# Patient Record
Sex: MALE | Race: WHITE
[De-identification: names, ages, dates, MRNs, and addresses within clinical notes are randomized per-mention and may not be internally consistent; named-entity substitution may affect disease eponyms.]

---

## 2019-09-20 ENCOUNTER — HOSPITAL ENCOUNTER (OUTPATIENT)
Dept: HOSPITAL 92 - NM | Age: 69
Discharge: HOME | End: 2019-09-20
Payer: MEDICARE

## 2019-09-20 DIAGNOSIS — R41.0: ICD-10-CM

## 2019-09-20 DIAGNOSIS — G20: Primary | ICD-10-CM

## 2019-09-20 DIAGNOSIS — R44.1: ICD-10-CM

## 2019-09-20 PROCEDURE — 78607: CPT

## 2019-09-20 PROCEDURE — A9584 IODINE I-123 IOFLUPANE: HCPCS

## 2019-09-20 NOTE — NM
NUCLEAR MEDICINE BRAIN IMAGING:

 

Date:  09/20/19 

 

HISTORY:  

Parkinson's disease. 

 

TECHNIQUE:  

A DaTscan with axial tomographic images of the brain was obtained 3 hours following the intravenous a
dministration of 5.4 mCi Iodine-123 Ioflupane. The patient was pretreated with 130 mg of potassium io
dide orally 1 hour prior to the injection. 

 

FINDINGS:

There is loss of normal symmetric uptake in the striata bilaterally. 

 

IMPRESSION: 

Parkinsonian syndrome. 

 

 

POS: [facility]

## 2020-09-19 ENCOUNTER — HOSPITAL ENCOUNTER (INPATIENT)
Dept: HOSPITAL 18 - NAV ACUTE | Age: 70
LOS: 41 days | Discharge: HOME HEALTH | DRG: 57 | End: 2020-10-30
Attending: INTERNAL MEDICINE | Admitting: INTERNAL MEDICINE
Payer: MEDICARE

## 2020-09-19 VITALS — BODY MASS INDEX: 19.8 KG/M2

## 2020-09-19 DIAGNOSIS — Z95.810: ICD-10-CM

## 2020-09-19 DIAGNOSIS — I25.5: ICD-10-CM

## 2020-09-19 DIAGNOSIS — G31.83: ICD-10-CM

## 2020-09-19 DIAGNOSIS — I50.9: ICD-10-CM

## 2020-09-19 DIAGNOSIS — Z66: ICD-10-CM

## 2020-09-19 DIAGNOSIS — I25.10: ICD-10-CM

## 2020-09-19 DIAGNOSIS — I25.2: ICD-10-CM

## 2020-09-19 DIAGNOSIS — D62: ICD-10-CM

## 2020-09-19 DIAGNOSIS — N39.0: ICD-10-CM

## 2020-09-19 DIAGNOSIS — I11.0: ICD-10-CM

## 2020-09-19 DIAGNOSIS — J44.9: ICD-10-CM

## 2020-09-19 DIAGNOSIS — B96.20: ICD-10-CM

## 2020-09-19 DIAGNOSIS — F02.80: ICD-10-CM

## 2020-09-19 DIAGNOSIS — E78.5: ICD-10-CM

## 2020-09-19 DIAGNOSIS — S72.142D: ICD-10-CM

## 2020-09-19 DIAGNOSIS — G20: Primary | ICD-10-CM

## 2020-09-19 DIAGNOSIS — R53.81: ICD-10-CM

## 2020-09-19 PROCEDURE — 85025 COMPLETE CBC W/AUTO DIFF WBC: CPT

## 2020-09-19 PROCEDURE — 36415 COLL VENOUS BLD VENIPUNCTURE: CPT

## 2020-09-19 PROCEDURE — 71045 X-RAY EXAM CHEST 1 VIEW: CPT

## 2020-09-19 PROCEDURE — 70450 CT HEAD/BRAIN W/O DYE: CPT

## 2020-09-19 PROCEDURE — 80048 BASIC METABOLIC PNL TOTAL CA: CPT

## 2020-09-19 PROCEDURE — 80053 COMPREHEN METABOLIC PANEL: CPT

## 2020-09-19 PROCEDURE — 94664 DEMO&/EVAL PT USE INHALER: CPT

## 2020-09-20 LAB
ALBUMIN SERPL BCG-MCNC: 3.3 G/DL (ref 3.4–4.8)
ALP SERPL-CCNC: 54 U/L (ref 40–110)
ALT SERPL W P-5'-P-CCNC: 19 U/L (ref 8–55)
ANION GAP SERPL CALC-SCNC: 13 MMOL/L (ref 10–20)
AST SERPL-CCNC: 25 U/L (ref 5–34)
BASOPHILS # BLD AUTO: 0 THOU/UL (ref 0–0.2)
BASOPHILS NFR BLD AUTO: 0.6 % (ref 0–1)
BILIRUB SERPL-MCNC: 2 MG/DL (ref 0.2–1.2)
BUN SERPL-MCNC: 17 MG/DL (ref 8.4–25.7)
CALCIUM SERPL-MCNC: 8.7 MG/DL (ref 7.8–10.44)
CHLORIDE SERPL-SCNC: 103 MMOL/L (ref 98–107)
CO2 SERPL-SCNC: 28 MMOL/L (ref 23–31)
CREAT CL PREDICTED SERPL C-G-VRATE: 87 ML/MIN (ref 70–130)
EOSINOPHIL # BLD AUTO: 0.4 THOU/UL (ref 0–0.7)
EOSINOPHIL NFR BLD AUTO: 4.4 % (ref 0–10)
GLOBULIN SER CALC-MCNC: 2.6 G/DL (ref 2.4–3.5)
GLUCOSE SERPL-MCNC: 102 MG/DL (ref 83–110)
HGB BLD-MCNC: 9.1 G/DL (ref 14–18)
LYMPHOCYTES # BLD AUTO: 1 THOU/UL (ref 1.2–3.4)
LYMPHOCYTES NFR BLD AUTO: 12.7 % (ref 21–51)
MANUAL DIFF??: NO
MCH RBC QN AUTO: 30.1 PG (ref 27–31)
MCV RBC AUTO: 96.4 FL (ref 78–98)
MONOCYTES # BLD AUTO: 0.7 THOU/UL (ref 0.11–0.59)
MONOCYTES NFR BLD AUTO: 8.7 % (ref 0–10)
NEUTROPHILS # BLD AUTO: 6.1 THOU/UL (ref 1.4–6.5)
NEUTROPHILS NFR BLD AUTO: 73.7 % (ref 42–75)
PLATELET # BLD AUTO: 465 THOU/UL (ref 130–400)
POTASSIUM SERPL-SCNC: 3.7 MMOL/L (ref 3.5–5.1)
RBC # BLD AUTO: 3.03 MILL/UL (ref 4.7–6.1)
SODIUM SERPL-SCNC: 140 MMOL/L (ref 136–145)
WBC # BLD AUTO: 8.2 THOU/UL (ref 4.8–10.8)

## 2020-09-20 RX ADMIN — LACTOBACILLUS ACIDOPH-L.BULGARICUS 1 MILLION CELL CHEWABLE TABLET SCH TAB: at 09:07

## 2020-09-20 RX ADMIN — Medication SCH TAB: at 09:08

## 2020-09-20 RX ADMIN — MOMETASONE FUROATE AND FORMOTEROL FUMARATE DIHYDRATE SCH PUFF: 200; 5 AEROSOL RESPIRATORY (INHALATION) at 17:59

## 2020-09-20 RX ADMIN — MOMETASONE FUROATE AND FORMOTEROL FUMARATE DIHYDRATE SCH PUFF: 200; 5 AEROSOL RESPIRATORY (INHALATION) at 05:41

## 2020-09-20 NOTE — HP
Admission to the [facility].



HISTORY OF PRESENT ILLNESS:  The patient is an unfortunate 69-year-old white male

with a progressive history of Lewy body dementia and Parkinson disease, who has

fallen and suffered a left intertrochanteric proximal femur fracture.  He was

treated with IM lucie fixation on September 16th at [facility].  He had

only complications postoperatively with some mild blood loss with anemia and urinary

tract infection.  He did not have any complications of his Lewy body dementia, but

has remained confused, and his primary caregiver, his wife, has made all decisions

and has been with the patient.  He has had no difficulty swallowing with his

Parkinson disease.  He, however, has remained very stiff and rigid.  He has been

attempting to cooperate with therapy and has had only occasional delirium. 



PAST MEDICAL HISTORY:  Positive for asthma and COPD as a younger man, only requiring

p.r.n. beta agonist at this time.  He also has a history of hypertension, which has

been well controlled and a history of a previous myocardial infarction with apparent

ischemic cardiomyopathy, requiring a defibrillator because of a 35% ejection

fraction many years ago.  He, however, has had no dyspnea or chest pain or

arrhythmias or syncope, but has progressively become weaker, more confused over the

last year with a progression of his Lewy body dementia and Parkinson disease.  He

also has a history of hyperlipidemia and class 1 congestive heart failure. 



ALLERGIES:  HE HAS NO KNOWN ALLERGIES.



SOCIAL HISTORY:  He is a nonsmoker, nondrinker.  He lives with his wife at home with

caregiver's assistance. 



FAMILY HISTORY:  Positive for his father dying of early heart attack.



REVIEW OF SYSTEMS:  He is unable to give history, but wife gives all the history.

He has only occasional cough.  He has had no wheezing.  He has denied any chest pain

or shortness of breath.  He has had no nausea, vomiting, diarrhea, or anorexia.  He

does have incontinence, but has no previous history of urinary tract infection until

this admission.  He has been treated with Myrbetriq, but with minimal improvement in

his incontinence.  He has had no problems with bowel movements or constipation. 



MEDICATIONS:  On admission here were:

1. Tylenol 500 mg every 6 hours as needed for pain.

2. Did take oxycodone immediately postop, but none since then. 

3. He is on calcium carbonate with vitamin D3.

4. Cefdinir 300 mg twice daily, urinary tract infection with Escherichia coli.

5. Lovenox 40 mg subcu for 8 weeks postop to prevent DVT per recommendations of

orthopedic surgeon. 

6. Ferrous sulfate 325 mg twice daily with meals.

7. MiraLAX 17 g daily.

8. Aricept 10 mg nightly.

9. Carvedilol 3.125 mg twice daily.

10. Ezetimibe 10 mg daily.

11. Fluticasone-salmeterol 230-21 two puffs every 12 hours.

12. Fluticasone 50 mcg nasal spray two sprays twice daily.

13. Magnesium oxide 400 mg daily.

14. Niacin 1000 mg nightly.

15. Lovaza 1 g twice daily.

16. He was on Myrbetriq as mentioned above, but wife wishes to discontinue this.



PHYSICAL EXAMINATION:

GENERAL:  At this time, shows an elderly white male, who is awake, no distress, only

occasional cough, but does not respond verbally very much.  He has been recently

working with therapy, but is very stiff and rigid, is not combatant or agitated. 

VITAL SIGNS:  Show him to have a temperature 96.9, pulse 80, respirations 18, O2

saturation is 95% on room air, and blood pressure 113/56. 

LUNGS:  Show only a few diffuse rhonchi. 

CARDIAC:  Shows regular rhythm.  Defibrillator is in place. 

ABDOMEN:  Soft and nontender.  No masses or organomegaly. 

SKIN/EXTREMITIES:  Display left hip lateral incision bandaged with no erythema,

warmth, or drainage.  No tenderness. 

NEUROLOGIC:  There is significant rigidity.  Minimal tremor.  Cranial nerves appear

to be intact, but there are masked faces and bradykinesia. 



LABORATORY DATA:  Laboratory will be done in the a.m.



ASSESSMENT:  

1. Left intertrochanteric hip fracture, status post IM nailing with pain control

with Tylenol.  We will see if needs stronger medication with therapy. 

2. Significant Lewy body dementia and Parkinson disease, only on Aricept at this

time with minimal agitation, attempting to cooperate with therapy. 

3. History of ischemic cardiomyopathy with defibrillator, but with no symptoms of

systolic heart failure at this time. 

4. Hypertension, controlled to goal.

5. Incontinence.  No relief with Myrbetriq, will be discontinued. 

6. Escherichia coli urinary tract infection, on cefdinir, for 2 more days.

7. Deep venous thrombosis prophylaxis with Lovenox for 8 weeks per recommendations

of orthopedic surgeon. 



The patient is not to be resuscitated and has an advance directive, that was brought

in by his power of , his wife. 







Job ID:  [id number] Atrial fibrillation

## 2020-09-21 RX ADMIN — MOMETASONE FUROATE AND FORMOTEROL FUMARATE DIHYDRATE SCH PUFF: 200; 5 AEROSOL RESPIRATORY (INHALATION) at 05:39

## 2020-09-21 RX ADMIN — MOMETASONE FUROATE AND FORMOTEROL FUMARATE DIHYDRATE SCH PUFF: 200; 5 AEROSOL RESPIRATORY (INHALATION) at 17:15

## 2020-09-21 RX ADMIN — Medication SCH TAB: at 08:45

## 2020-09-21 RX ADMIN — LACTOBACILLUS ACIDOPH-L.BULGARICUS 1 MILLION CELL CHEWABLE TABLET SCH TAB: at 08:45

## 2020-09-21 NOTE — PRG
DATE OF SERVICE:  09/21/2020



SUBJECTIVE:  The patient is in bed, __________ sleeping, has eaten partially his

supper.  He does awaken to questions and does answer fairly appropriately.  However,

wife states that he has not cooperated well today and has been much more lethargic

and stiff than he has in the past. 



OBJECTIVE:  VITAL SIGNS:  Blood pressure is 129/65, temperature is 98, pulse 85,

respirations 20, and O2 sats 93% on room air. 

LUNGS:  Clear. 

CARDIAC:  Regular rhythm. 

ABDOMEN:  Soft and nontender. 

SKIN AND EXTREMITIES:  No edema, clubbing, or cyanosis. 

NEUROLOGIC:  Cogwheel rigidity, bradykinesia.



ASSESSMENT:  

1. Left intertrochanteric hip fracture status post intramedullary nailing with pain

control. 

2. Significant Lewy body dementia with significant stiffness, bradykinesia, and

slowly worsening cognitive deficits. 

3. History of ischemic cardiomyopathy with defibrillator with no evidence of angina

or congestive heart failure. 

4. Hypertension, controlled to goal.

5. Escherichia coli urinary tract infection, on cefdinir for one more day.

6. Deep venous thrombosis prophylaxis with Lovenox for 8 weeks.







Job ID:  [id number]

## 2020-09-21 NOTE — PRG
DATE OF SERVICE:  09/20/2020



SUBJECTIVE:  The patient is more sedated today.  Still not improving in his mental

status.  Has not had therapy today because of the weekend. 



OBJECTIVE:  VITAL SIGNS:  Show temperature is 98, pulse 82, respirations 18, O2 sats

100% on room air, and blood pressure 130/68. 

NEUROLOGIC:  Shows significant rigidity, bradykinesia.  Deep tendon reflex, 2+ and

equal.  Cranial nerves intact. 



LABORATORY DATA:  White count is 8200, hematocrit 29, and hemoglobin 9.  Sodium 140,

potassium 3.7, chloride 103, bicarb 28, BUN 17, creatinine 0.78, total bilirubin 2,

and albumin 3. 



PLAN:  

1. Continue PT, OT, and speech evaluation tomorrow.

2. Continue Aricept.

3. Continue cefdinir for urinary tract infection.

4. Monitor oral intake.







Job ID:  [id number]

## 2020-09-22 RX ADMIN — MOMETASONE FUROATE AND FORMOTEROL FUMARATE DIHYDRATE SCH PUFF: 200; 5 AEROSOL RESPIRATORY (INHALATION) at 18:21

## 2020-09-22 RX ADMIN — Medication SCH TAB: at 09:17

## 2020-09-22 RX ADMIN — LACTOBACILLUS ACIDOPH-L.BULGARICUS 1 MILLION CELL CHEWABLE TABLET SCH TAB: at 09:17

## 2020-09-22 RX ADMIN — MOMETASONE FUROATE AND FORMOTEROL FUMARATE DIHYDRATE SCH PUFF: 200; 5 AEROSOL RESPIRATORY (INHALATION) at 05:37

## 2020-09-23 RX ADMIN — LACTOBACILLUS ACIDOPH-L.BULGARICUS 1 MILLION CELL CHEWABLE TABLET SCH TAB: at 08:27

## 2020-09-23 RX ADMIN — MOMETASONE FUROATE AND FORMOTEROL FUMARATE DIHYDRATE SCH PUFF: 200; 5 AEROSOL RESPIRATORY (INHALATION) at 06:06

## 2020-09-23 RX ADMIN — MOMETASONE FUROATE AND FORMOTEROL FUMARATE DIHYDRATE SCH PUFF: 200; 5 AEROSOL RESPIRATORY (INHALATION) at 18:28

## 2020-09-23 RX ADMIN — Medication SCH TAB: at 08:27

## 2020-09-23 NOTE — PRG
DATE OF SERVICE:  09/22/2020



SUBJECTIVE:  The patient is sitting in bed, has eaten slightly, but did eat better

today.  Did work with therapy today and having tolerable pain, but did have some

agitation this morning; however, wife is in the room and did calm him down and he

did work with therapy. 



OBJECTIVE:  VITAL SIGNS:  Shows his blood pressure is 115/61, temperature is 98,

pulse 70, respirations 20, O2 saturations 95% on room air. 

LUNGS:  Clear. 

CARDIAC:  Showed regular rhythm. 

ABDOMEN:  Soft and nontender. 

NEUROLOGICAL:  Shows significant bradykinesia and cogwheel rigidity and some minimal

tremor.  The patient also is intermittently lethargic, but does respond

appropriately at times. 



ASSESSMENT:  

1. Lewy body dementia, slowly progressive.

2. Fracture of the left hip status, intramedullary nailing with pain control on

Tylenol. 

3. History of ischemic cardiomyopathy, a defibrillator.  No evidence of angina.

4. Hypertension, controlled to go.

5. Escherichia coli urinary tract infection with finished treatment with cefdinir.

6. Deep vein thrombosis prophylaxis, on Lovenox.



PLAN:  

1. Continue PT, OT.

2. Continue to monitor for behavioral disturbances.

3. Continue to monitor for DVT prophylaxis.

4. Continue to monitor for recurrent signs of ischemia.

5. Continue to monitor vital signs closely.







Job ID:  [id number]

## 2020-09-24 RX ADMIN — HYDROCODONE BITARTRATE AND ACETAMINOPHEN PRN TAB: 5; 325 TABLET ORAL at 09:40

## 2020-09-24 RX ADMIN — HYDROCODONE BITARTRATE AND ACETAMINOPHEN PRN TAB: 5; 325 TABLET ORAL at 20:55

## 2020-09-24 RX ADMIN — LACTOBACILLUS ACIDOPH-L.BULGARICUS 1 MILLION CELL CHEWABLE TABLET SCH TAB: at 08:10

## 2020-09-24 RX ADMIN — MOMETASONE FUROATE AND FORMOTEROL FUMARATE DIHYDRATE SCH PUFF: 200; 5 AEROSOL RESPIRATORY (INHALATION) at 05:40

## 2020-09-24 RX ADMIN — MOMETASONE FUROATE AND FORMOTEROL FUMARATE DIHYDRATE SCH PUFF: 200; 5 AEROSOL RESPIRATORY (INHALATION) at 17:35

## 2020-09-24 RX ADMIN — Medication SCH TAB: at 08:10

## 2020-09-25 RX ADMIN — Medication SCH TAB: at 08:34

## 2020-09-25 RX ADMIN — MOMETASONE FUROATE AND FORMOTEROL FUMARATE DIHYDRATE SCH PUFF: 200; 5 AEROSOL RESPIRATORY (INHALATION) at 17:52

## 2020-09-25 RX ADMIN — LACTOBACILLUS ACIDOPH-L.BULGARICUS 1 MILLION CELL CHEWABLE TABLET SCH TAB: at 08:33

## 2020-09-25 RX ADMIN — MOMETASONE FUROATE AND FORMOTEROL FUMARATE DIHYDRATE SCH PUFF: 200; 5 AEROSOL RESPIRATORY (INHALATION) at 05:48

## 2020-09-25 NOTE — PRG
DATE OF SERVICE:  09/25/2020



SUBJECTIVE:  The patient is sitting in the bed, visiting with his wife, answers

questions occasionally and appropriately.  Wife states he had a much better day

today where he is working with therapy, has been eating well and she felt that there

has been a marked improvement. 



OBJECTIVE:  VITAL SIGNS:  Shows temperature 97, pulse 79, respirations 20, O2 sats

97% on room air, blood pressure 119/56. 

LUNGS:  Clear. 

CARDIAC:  Regular rhythm. ABDOMEN:  Soft and nontender. 

NEUROLOGIC:  Shows significant bradykinesia and cogwheel rigidity.   

__________ show healing intramedullary nail  __________.



ASSESSMENT:  

1. Lewy body dementia, appears to be slightly improved today, but has generally been

slowly progressive. 

2. Fracture of left hip, status post intramedullary nailing, healing well.

3. History of ischemic cardiomyopathy status post defibrillator. Asymptomatic.

4. Hypertension, controlled to goal.

5. Escherichia coli tract infection, finished treatment with cefdinir.

6. Deep venous thrombosis prophylaxis and stress ulcer prophylaxis.



PLAN:  

1. Continue PT, OT.

2. Continue DVT and stress ulcer prophylaxis.

3. Continue to monitor for signs of ischemia.

4. Continue to monitor for signs of behavior disturbance.







Job ID:  [id number]

## 2020-09-25 NOTE — PRG
DATE OF SERVICE:  09/24/2020



SUBJECTIVE:  The patient feels weak and tired, working with therapy today and

resting in the bed.  The patient's wife is not in the room. 



OBJECTIVE:  VITAL SIGNS:  Temperature 96.6, pulse 76, respirations 20, O2 sats 97%

on room air, blood pressure 118/68. 

LUNGS:  Clear. 

CARDIAC:  Regular rhythm. 

ABDOMEN:  Soft and nontender. 

EXTREMITIES:  Healing left lateral hip incision. 

NEUROLOGICAL:  Significant bradykinesia and cogwheel rigidity.



ASSESSMENT:  

1. Lewy body dementia and Parkinson disease, slowly progressing, but does appear to

be improving with therapy today after exacerbation after surgery. 

2. Left hip fracture, status post intramedullary nailing, healing well with probable

pain, on Tylenol. 

3. Ischemic cardiomyopathy, asymptomatic, status post defibrillator.

4. E coli urinary tract infection, resolved.

5. Hypertension, controlled to goal.



PLAN:  

1. Continue PT, OT.

2. Continue to monitor for behavioral disturbances.

3. Continue to monitor for ischemia.

4. Continue DVT, stress ulcer prophylaxis.

5. Continue to restrict pain medications __________ until return from my vacation on

October 11. 







Job ID:  [id number]

## 2020-09-26 RX ADMIN — LACTOBACILLUS ACIDOPH-L.BULGARICUS 1 MILLION CELL CHEWABLE TABLET SCH TAB: at 08:21

## 2020-09-26 RX ADMIN — Medication SCH TAB: at 08:21

## 2020-09-26 RX ADMIN — MOMETASONE FUROATE AND FORMOTEROL FUMARATE DIHYDRATE SCH PUFF: 200; 5 AEROSOL RESPIRATORY (INHALATION) at 05:56

## 2020-09-26 RX ADMIN — MOMETASONE FUROATE AND FORMOTEROL FUMARATE DIHYDRATE SCH PUFF: 200; 5 AEROSOL RESPIRATORY (INHALATION) at 17:38

## 2020-09-27 RX ADMIN — Medication SCH TAB: at 08:24

## 2020-09-27 RX ADMIN — MOMETASONE FUROATE AND FORMOTEROL FUMARATE DIHYDRATE SCH PUFF: 200; 5 AEROSOL RESPIRATORY (INHALATION) at 17:47

## 2020-09-27 RX ADMIN — MOMETASONE FUROATE AND FORMOTEROL FUMARATE DIHYDRATE SCH PUFF: 200; 5 AEROSOL RESPIRATORY (INHALATION) at 05:35

## 2020-09-27 RX ADMIN — LACTOBACILLUS ACIDOPH-L.BULGARICUS 1 MILLION CELL CHEWABLE TABLET SCH TAB: at 08:25

## 2020-09-28 RX ADMIN — Medication SCH TAB: at 08:07

## 2020-09-28 RX ADMIN — LACTOBACILLUS ACIDOPH-L.BULGARICUS 1 MILLION CELL CHEWABLE TABLET SCH TAB: at 08:08

## 2020-09-28 RX ADMIN — MOMETASONE FUROATE AND FORMOTEROL FUMARATE DIHYDRATE SCH PUFF: 200; 5 AEROSOL RESPIRATORY (INHALATION) at 06:04

## 2020-09-28 RX ADMIN — MOMETASONE FUROATE AND FORMOTEROL FUMARATE DIHYDRATE SCH PUFF: 200; 5 AEROSOL RESPIRATORY (INHALATION) at 17:50

## 2020-09-28 NOTE — PRG
DATE OF SERVICE:  09/28/2020



SUBJECTIVE:  Mr. Canas is a pleasant 69-year-old white male.  Unfortunately, he fell

and had a left intertrochanteric proximal femur fracture.  He was taken to [facility] on September 16, had an intramedullary lucie fixation.

Postoperatively, he had some mild blood loss with anemia and urinary tract

infection.  He was placed on cefdinir and seem to do well.  He has comorbidities of

Lewy body dementia, Parkinson's disease, asthma, COPD, prior MI with apparent

ischemic cardiomyopathy requiring a defibrillator because of the 35% ejection

fraction, hyperlipidemia, class 1 congestive heart failure, incontinence, and

generalized weakness. 



He is seen late this evening and is easily arousable.  He seems to be more cognitive

than he has been on his admission. 



OBJECTIVE:  VITAL SIGNS:  Today reveal blood pressure 117/62, pulse 80 to 81,

respirations 20, O2 saturation 97% to 98% on room air, T-max 97.8. 

GENERAL:  This is a well-developed, well-nourished pleasant white male, presently

resting. 

HEENT:  Normocephalic and nontraumatic cranium.  Pupils equally round and reactive.

Extraocular movements intact.  Nose and throat are slightly dry, but clear. 

NECK:  Supple without masses, nodes, or bruits. 

CHEST:  Clear to auscultation.  No rales, no rhonchi, no wheezes.  No cough is

noted. 

HEART:  Reveals a regular rate and rhythm without murmurs, gallops, or rubs. 

ABDOMEN:  Soft, nontender without organomegaly.  Normal bowel sounds in all 4

quadrants. 

GENITOURINARY:  Deferred. 

EXTREMITIES:  Reveal no clubbing, cyanosis, or edema.  Left lateral hip incision

from intramedullary nailing is healing well without any redness, induration, or

drainage. 

NEUROLOGIC:  The patient has Parkinson's with cogwheel rigidity and some significant

bradykinesias. 



ASSESSMENT:  

1. Slightly more cognitive than on admission.

2. Lewy body dementia.

3. Parkinson's disease.

4. Left hip fracture, status post IM nailing.

5. Ischemic cardiomyopathy with ejection fraction 35% and status post defibrillator.

6. Escherichia coli urinary tract infection, resolved.

7. Hypertension.

8. Incontinence, unresponsive to Myrbetriq.

9. Deep venous thrombosis prophylaxis with Lovenox for 8 weeks per recommendation of

orthopedic surgeon. 

10. DNR.



PLAN:  

1. Continue supportive care.

2. Continue to monitor the patient's blood pressure closely and adjust medications

as needed. 

3. Patient has finished his cefdinir for his E. coli infection.

4. DVT prophylaxis with Lovenox for 8 weeks per Orthopedic Surgery.

5. Patient is DNR.

6. Continue physical therapy and occupational therapy.







Job ID:  [id number]

## 2020-09-29 RX ADMIN — Medication SCH TAB: at 08:11

## 2020-09-29 RX ADMIN — MOMETASONE FUROATE AND FORMOTEROL FUMARATE DIHYDRATE SCH PUFF: 200; 5 AEROSOL RESPIRATORY (INHALATION) at 17:20

## 2020-09-29 RX ADMIN — MOMETASONE FUROATE AND FORMOTEROL FUMARATE DIHYDRATE SCH PUFF: 200; 5 AEROSOL RESPIRATORY (INHALATION) at 05:26

## 2020-09-29 RX ADMIN — LACTOBACILLUS ACIDOPH-L.BULGARICUS 1 MILLION CELL CHEWABLE TABLET SCH TAB: at 08:09

## 2020-09-29 NOTE — PRG
DATE OF SERVICE:  09/29/2020



SUBJECTIVE:  Mr. Canas is a well-developed, thin 69-year-old pleasant white male.

Unfortunately, he fell and had a left intertrochanteric proximal femur fracture.  He

was taken to [facility] and on September 16th had an intramedullary

lucie fixation done.  Postoperatively, he was anemic, found to have urinary tract

infection, was started on cefdinir.  He is also noted to have comorbidities of Lewy

body dementia, Parkinson disease, asthma, COPD, prior MI with apparent ischemic

cardiomyopathy with an ejection fraction of 35%, requiring a defibrillator,

hyperlipidemia, class I congestive heart failure, incontinence, and generalized

weakness.  He eventually was stabilized and transferred to [facility] for PT and OT to increase his strength and stamina.  He also has some

cognitive loss and since he has been on therapy, that has gradually gotten better. 



OBJECTIVE:  VITAL SIGNS:  Today reveal blood pressure this morning was 109/66, pulse

81 to 97, respirations 20, O2 saturation 97% on room air, T-max 97.5. 

GENERAL:  This is a well-developed, thin, very soft-spoken white male, in no

apparent distress at this time.  He is actually found walking with physical therapy

in the hallway.  His Parkinson limits him, but once he starts going, he does much

better.  After therapy yesterday, he states that he is gradually getting a little

bit better every day.  He still is a 2-person assist, but making strides towards

being a one person assist. 

HEENT:  Normocephalic and nontraumatic cranium.  Pupils equally round and reactive.

Extraocular movement is intact.  Nose and throat are clear. 

NECK:  Supple without masses, nodes, or bruits. 

CHEST:  Clear to auscultation.  No rales, rhonchi, wheezes, or cough is noted. 

HEART:  Regular rate and rhythm without murmurs, gallops, rubs. 

ABDOMEN:  Soft, nontender without organomegaly.  Normal bowel sounds in all 4

quadrants. 

:  Deferred. 

EXTREMITIES:  No clubbing, cyanosis, or edema.  Left lateral hip incision from the

intramedullary nailing is healing well.  The patient has significant rigidity but is

slowly improving with his physical therapy. 



ASSESSMENT:  

1. Status post left hip fracture, IM nailing.

2. Ischemic cardiomyopathy with ejection fraction 35%, post defibrillator.

3. Escherichia coli urinary tract infection, resolved.

4. Hypertension.

5. Incontinence, unresponsive to Myrbetriq.

6. Lewy body dementia.

7. Parkinson disease.

8. Decreased cognition on admission, which is slowly improving.

9. Incontinence, unresponsive to Myrbetriq.

10. DVT prophylaxis with Lovenox for 8 weeks per orthopedic surgeon recommendation.

11. DNR.



PLAN:  

1. Continue to follow the patient's blood pressure closely and adjust medications as

needed. 

2. Continue to monitor the patient's labs at least q. week.

3. Continue present medications.

4. Continue supportive care.

5. DVT prophylaxis with Lovenox.

6. Continue physical therapy and occupational therapy.







Job ID:  [id number]

## 2020-09-30 RX ADMIN — MOMETASONE FUROATE AND FORMOTEROL FUMARATE DIHYDRATE SCH PUFF: 200; 5 AEROSOL RESPIRATORY (INHALATION) at 16:57

## 2020-09-30 RX ADMIN — MOMETASONE FUROATE AND FORMOTEROL FUMARATE DIHYDRATE SCH PUFF: 200; 5 AEROSOL RESPIRATORY (INHALATION) at 06:01

## 2020-09-30 RX ADMIN — Medication SCH TAB: at 09:00

## 2020-09-30 RX ADMIN — LACTOBACILLUS ACIDOPH-L.BULGARICUS 1 MILLION CELL CHEWABLE TABLET SCH TAB: at 09:00

## 2020-09-30 NOTE — PRG
DATE OF SERVICE:  09/30/2020



SUBJECTIVE:  Mr. Canas is a 69-year-old white male, who was at home when he fell.  He

had a resultant left intertrochanteric proximal femur fracture.  He was taken to

[facility] on September 16, had an intramedullary lucie fixation done.

Postoperatively, he is anemic, he was found to have a urinary tract infection and he

had to be started on cefdinir.  He also was noted to have Lewy body dementia,

Parkinson disease, asthma, COPD, prior MI with apparent ischemic cardiomyopathy with

ejection fraction of 35%, requiring defibrillator, hyperlipidemia, class I

congestive heart failure, incontinence, and generalized weakness.  Eventually, he

was stabilized and has been transferred to [facility] under the care

of Dr. Chandra Bee.  He is here for PT and OT to increase his strength and

stamina. 



Since the surgery, he apparently had some cognitive loss and states he has been here

and does gradually getting better with his therapy. 



OBJECTIVE:  VITAL SIGNS:  This morning reveal blood pressure of 116/67, pulse 80,

respirations 18, O2 saturations 99% on room air, T-max 97.2. 

GENERAL:  This is a well-developed, well-nourished, thin white male, in no apparent

distress at this time. 

HEENT:  Reveals normocephalic and nontraumatic cranium.  Pupils equally round and

reactive.  Extraocular movements are intact.  Nose and throat are slightly dry. 

NECK:  Supple without masses, nodes, or bruits. 

CHEST:  Clear to auscultation.  No rales, rhonchi, wheezes, or cough is heard. 

HEART:  Reveals a regular rate and rhythm without murmurs, gallops, or rubs. 

ABDOMEN:  Soft and nontender without organomegaly.  Normal bowel sounds in all 4

quadrants is noted.  No rebound or guarding is noted. 

:  Deferred. 

EXTREMITIES:  Reveal no clubbing, cyanosis, or edema.  Left hip incision is healing

well.  The patient has continued rigidity secondary to his Parkinson's.  His

cognition is gradually improving as reported by therapy. 



ASSESSMENT:  

1. Status post left hip fracture with status post intramedullary nailing.

2. Ischemic cardiomyopathy with ejection fraction 35%, post defibrillator placement.

3. Escherichia coli urinary tract infection, resolved.

4. Hypertension.

5. Lewy body dementia.

6. Parkinson disease.

7. Decreased cognition on admission, which is slowly improving.

8. Incontinence, unresponsive to Myrbetriq.

9. DVT prophylaxis with Lovenox for 8 weeks per orthopedic surgeon recommendation.

10. DNR.



PLAN:  

1. Continue to monitor the patient's blood pressure closely.

2. Continue to monitor the patient's labs q.week.

3. Continue present medications.

4. Continue support.

5. Stress ulcer prophylaxis.

6. DVT prophylaxis with Lovenox.

7. Decubitus precautions.

8. Continue physical therapy and occupational therapy.







Job ID:  [id number]

## 2020-10-01 RX ADMIN — MOMETASONE FUROATE AND FORMOTEROL FUMARATE DIHYDRATE SCH PUFF: 200; 5 AEROSOL RESPIRATORY (INHALATION) at 05:32

## 2020-10-01 RX ADMIN — MOMETASONE FUROATE AND FORMOTEROL FUMARATE DIHYDRATE SCH PUFF: 200; 5 AEROSOL RESPIRATORY (INHALATION) at 17:44

## 2020-10-01 RX ADMIN — HYDROCODONE BITARTRATE AND ACETAMINOPHEN PRN TAB: 5; 325 TABLET ORAL at 12:18

## 2020-10-01 RX ADMIN — Medication SCH TAB: at 08:18

## 2020-10-01 RX ADMIN — LACTOBACILLUS ACIDOPH-L.BULGARICUS 1 MILLION CELL CHEWABLE TABLET SCH TAB: at 08:19

## 2020-10-01 NOTE — PRG
DATE OF SERVICE:  10/01/2020



SUBJECTIVE:  Mr. Canas is a pleasant, soft-spoken 69-year-old white male.  He 
fell at

home unfortunately and had a resultant left intertrochanteric proximal femur

fracture.  He is taken to [facility], had an intramedullary lucie

fixation done.  Postoperatively, he is anemic and found to have urinary tract

infection and was started on cefdinir.  He has comorbidities of Parkinson 
disease

with Lewy body dementia, asthma, COPD, prior MI with apparent ischemic

cardiomyopathy with ejection fraction 35% requiring defibrillator, 
hyperlipidemia,

class I congestive heart failure, generalized weakness.  He was stabilized and

transferred to [facility] to Dr. Bee for PT and OT to 
increase

his strength and stamina. 



The patient's wife is in the room and answering most of his questions today. 



She states she is doing well and he is doing fairly well, but she is not sure 
she

can take him home and care for him. 



She states she is going to need quite a bit to help.



OBJECTIVE:  VITAL SIGNS:  Today reveal blood pressure 113/57, pulse 80, 
respirations

18 to 20, O2 saturation 98% to 99% on room air, T-max 97.9. 

GENERAL:  He is a well-developed, well-nourished, thin white male, in no 
apparent

distress at this time. 

HEENT:  Reveals normocephalic and nontraumatic cranium.  Pupils are equal, 
round,

and reactive.  Extraocular movements are intact.  Nose and throat are clear. 

NECK:  Supple without masses, nodes, or bruits. 

CHEST:  Clear to auscultation.  No rales, rhonchi, wheezes are heard. 

HEART:  Reveals a regular rate and rhythm without murmurs, gallops, or rubs. 

ABDOMEN:  Soft, nontender without organomegaly.  Normal bowel sounds are noted 
in

all 4 quadrants.  No rebound or guarding is noted. 

:  Deferred. 

EXTREMITIES:  Reveal no clubbing, cyanosis, or edema.  Left hip incisions

are healing well, not red, not oozing.  The patient's surgeon wants to take them
out

himself and will have an appointment tomorrow to do that. 

NEUROLOGIC:  The patient continues with some rigidity secondary to his 
Parkinson's.

His cognition is slightly improved according to his wife. 



ASSESSMENT:  

1. Status post left hip fracture with status post intramedullary nailing.

2. Ischemic cardiomyopathy with ejection fraction 35%, status post defibrillator

placement. 

3. Hypertension.

4. Lewy body dementia.

5. Parkinson disease.

6. Escherichia coli urinary tract infection, which resolved.

7. Decreased cognition on admission, now is slowly improving.

8. Incontinence, unresponsive to Myrbetriq.

9. Deep vein thrombosis prophylaxis with Lovenox for 8 weeks per orthopedic 
surgeon

recommendation. 

10. DNR.



PLAN:  

1. The patient is supposed to see his orthopedic surgeon tomorrow.

2. Continue to monitor the patient's labs every week.

3. Continue present medications.

4. Continue supportive care.

5. Stress ulcer prophylaxis.

6. DVT prophylaxis with Lovenox.

7. Decubitus precautions.

8. Continue PT and OT.







Job ID:  [id number]



Roche[facility] HospitalMARGIE

## 2020-10-02 RX ADMIN — LACTOBACILLUS ACIDOPH-L.BULGARICUS 1 MILLION CELL CHEWABLE TABLET SCH TAB: at 08:06

## 2020-10-02 RX ADMIN — Medication SCH TAB: at 08:06

## 2020-10-02 RX ADMIN — MOMETASONE FUROATE AND FORMOTEROL FUMARATE DIHYDRATE SCH PUFF: 200; 5 AEROSOL RESPIRATORY (INHALATION) at 17:14

## 2020-10-02 RX ADMIN — MOMETASONE FUROATE AND FORMOTEROL FUMARATE DIHYDRATE SCH PUFF: 200; 5 AEROSOL RESPIRATORY (INHALATION) at 05:34

## 2020-10-02 NOTE — PRG
DATE OF SERVICE:  10/02/2020



SUBJECTIVE:  Mr. Canas is a well-developed, well-nourished 69-year-old very

unfortunate man.  He fell at home and had a resultant left intertrochanteric

proximal femur fracture.  He was taken to [facility] to the 
surgical

suite and he had an intramedullary lucie fixation done.  Postoperatively, he was

anemic and found to have a urinary tract infection.  He also has comorbidities 
of

COPD, coronary artery disease with prior MI, ischemic cardiomyopathy with 
ejection

fraction 35%, class I congestive heart failure, hyperlipidemia, generalized

weakness, Parkinson disease, and Lewy body dementia.  He eventually was 
stabilized

and then transferred to [facility] to Dr. Bee for physical

therapy and occupational therapy to increase his strength and stamina.  His

cognitive deficits have also been addressed while he is here and he is gradually

improving. 



The patient has an appointment today with Dr. Spain early this afternoon, is

leaving at this time. 



PHYSICAL EXAMINATION:

VITAL SIGNS:  Today reveal blood pressure this morning 106/66, pulse 82,

respirations 18 to 20, O2 saturations 97% to 98% on room air, T-max 98.2. 

GENERAL:  This is a well-developed, well-nourished, thin, very quite spoken 
white

male, in no apparent distress. 

CHEST:  Clear without rales, rhonchi, or wheezes. 

HEART:  Reveals regular rate and rhythm. 

ABDOMEN:  Soft, nontender without organomegaly. 

:  Deferred. 

EXTREMITIES:  Left hip incisions are to be inspected by Dr. Spain and chris 
most

likely to be removed today. 



His physical therapy is actually going very well.  On Wednesday, he was able to 
walk

21 feet, then 34 feet, then 45 feet. 



On Thursday, he was only able to walk 27 feet.  This morning, he is able to walk
50

feet, then 28 feet, then 70 feet. 



Speech therapy also states he is doing better and she wants to liberalize his 
diet a

little bit. 



He has also seemed to be eating better.  Yesterday, he ate 100% of breakfast, 
50% of

lunch, 70% of supper, and this morning, he ate 75% of breakfast and 50% of 
lunch. 



ASSESSMENT:  

1. Status post fall with resultant left intertrochanteric proximal femur 
fracture

with intramedullary nailing by Dr. Spain. 

2. Ischemic cardiomyopathy with ejection fraction 35%, status post defibrillator

placement. 

3. Hypertension.

4. Parkinson disease.

5. Lewy body dementia.

6. Decreased cognition on admission, now improving.

7. Incontinent, unresponsive to Myrbetriq.

8. History of urinary tract infections, which is resolved.

9. Deep venous thrombosis prophylaxis with Lovenox for 8 weeks per orthopedic

surgeon's recommendation. 

10. DNR.



PLAN:  

1. The patient is going to see Dr. Spain this afternoon.

2. We will continue the patient's labs once a week.

3. Continue present medications.

4. Continue supportive care.

5. Stress ulcer prophylaxis.

6. DVT prophylaxis.

7. Continue decubitus precautions.

8. Continue physical therapy and occupational therapy.

9. We will await any further instructions from Dr. Spain, his orthopedist.





Job ID:  [id number]



MTDD

## 2020-10-03 RX ADMIN — MOMETASONE FUROATE AND FORMOTEROL FUMARATE DIHYDRATE SCH PUFF: 200; 5 AEROSOL RESPIRATORY (INHALATION) at 05:28

## 2020-10-03 RX ADMIN — HYDROCODONE BITARTRATE AND ACETAMINOPHEN PRN TAB: 5; 325 TABLET ORAL at 16:18

## 2020-10-03 RX ADMIN — Medication SCH TAB: at 08:24

## 2020-10-03 RX ADMIN — LACTOBACILLUS ACIDOPH-L.BULGARICUS 1 MILLION CELL CHEWABLE TABLET SCH TAB: at 08:25

## 2020-10-03 RX ADMIN — MOMETASONE FUROATE AND FORMOTEROL FUMARATE DIHYDRATE SCH PUFF: 200; 5 AEROSOL RESPIRATORY (INHALATION) at 18:23

## 2020-10-03 NOTE — PRG
DATE OF SERVICE:  10/03/2020



SUBJECTIVE:  Mr. Canas is up in bed.  He is pleasantly confused.  He wants help

carrying his luggage.  He is not oriented to place. 



OBJECTIVE:  VITAL SIGNS:  He is afebrile, heart rate 80, respirations 18, oxygen

saturation 95% on room air, and blood pressure 112/62. 

CARDIOVASCULAR SYSTEM:  S1 and S2 plus. 

RESPIRATORY SYSTEM:  Normal vesicular breath sounds. 

ABDOMEN:  Soft and nontender.  Bowel sounds heard in all quadrants. 

EXTREMITIES:  Without cyanosis or clubbing.



IMPRESSION:  

1. Left intertrochanteric fracture, requiring surgical fixation.  Apparently, he had

a followup with Orthopedics yesterday and there is minimal displacement, so they

have changed his weightbearing to only toe-touch. 

2. Postoperative anemia.

3. Chronic obstructive pulmonary disease.

4. Coronary artery disease.

5. Ischemic cardiomyopathy with ejection fraction of 35%.

6. Dyslipidemia.

7. Parkinson disease.

8. Lewy body dementia.



PLAN:  

1. Continue current medications.

2. Heart healthy diet.

3. Orthopedic precautions.

4. DVT prophylaxis.

5. Decubitus precautions.

6. Stress ulcer prophylaxis.

7. Continue therapy.

8. Routine laboratory values.







Job ID:  [id number]

## 2020-10-04 RX ADMIN — MOMETASONE FUROATE AND FORMOTEROL FUMARATE DIHYDRATE SCH PUFF: 200; 5 AEROSOL RESPIRATORY (INHALATION) at 17:51

## 2020-10-04 RX ADMIN — HYDROCODONE BITARTRATE AND ACETAMINOPHEN PRN TAB: 5; 325 TABLET ORAL at 08:07

## 2020-10-04 RX ADMIN — MOMETASONE FUROATE AND FORMOTEROL FUMARATE DIHYDRATE SCH PUFF: 200; 5 AEROSOL RESPIRATORY (INHALATION) at 05:24

## 2020-10-04 RX ADMIN — Medication SCH TAB: at 08:10

## 2020-10-04 RX ADMIN — LACTOBACILLUS ACIDOPH-L.BULGARICUS 1 MILLION CELL CHEWABLE TABLET SCH TAB: at 08:09

## 2020-10-04 NOTE — PRG
DATE OF SERVICE:  10/04/2020



SUBJECTIVE:  Mr. Canas is resting in bed.  Still has a flat affect.  Remains

confused.  No family at bedside. 



OBJECTIVE:  VITAL SIGNS:  He is afebrile, heart rate 80, respirations 16, oxygen

saturation 97% on room air, and blood pressure 102/67. 

CARDIOVASCULAR SYSTEM:  S1 and S2 plus. 

RESPIRATORY SYSTEM:  Normal vesicular breath sounds. 

ABDOMEN:  Soft and nontender.  Bowel sounds heard in all quadrants. 

EXTREMITIES:  Without cyanosis or clubbing.  Left hip incision __________. 

CENTRAL NERVOUS SYSTEM:  Cognitive deficits present.  Generalized weakness.

Otherwise, nonfocal. 



IMPRESSION:  

1. Left intertrochanteric fracture, status post surgical fixation.

2. Chronic obstructive pulmonary disease.

3. Coronary artery disease.

4. Ischemic cardiomyopathy.

5. Dyslipidemia.

6. Parkinson disease.

7. Lewy body dementia.



PLAN:  

1. Continue current medications.

2. Orthopedic precautions.

3. Heart-healthy diet.

4. DVT prophylaxis.

5. Decubitus precautions.

6. Physical therapy.

7. Routine laboratory values.

8. Dr. Nae alexander tonight.





Job ID:  [id number]

## 2020-10-05 RX ADMIN — HYDROCODONE BITARTRATE AND ACETAMINOPHEN PRN TAB: 5; 325 TABLET ORAL at 08:35

## 2020-10-05 RX ADMIN — Medication SCH TAB: at 08:37

## 2020-10-05 RX ADMIN — HYDROCODONE BITARTRATE AND ACETAMINOPHEN PRN TAB: 5; 325 TABLET ORAL at 03:15

## 2020-10-05 RX ADMIN — MOMETASONE FUROATE AND FORMOTEROL FUMARATE DIHYDRATE SCH PUFF: 200; 5 AEROSOL RESPIRATORY (INHALATION) at 05:09

## 2020-10-05 RX ADMIN — LACTOBACILLUS ACIDOPH-L.BULGARICUS 1 MILLION CELL CHEWABLE TABLET SCH TAB: at 08:35

## 2020-10-05 RX ADMIN — MOMETASONE FUROATE AND FORMOTEROL FUMARATE DIHYDRATE SCH PUFF: 200; 5 AEROSOL RESPIRATORY (INHALATION) at 18:45

## 2020-10-05 RX ADMIN — HYDROCODONE BITARTRATE AND ACETAMINOPHEN PRN TAB: 5; 325 TABLET ORAL at 15:18

## 2020-10-05 NOTE — PRG
DATE OF SERVICE:  10/05/2020



SUBJECTIVE:  Mr. Canas is a 69-year-old, thin, unfortunate male who has Parkinson's

and Lewy body dementia.  Unfortunately, he fell at home and had a resultant left

intertrochanteric proximal femur fracture.  He was taken to surgical suite by Dr. Spain and had intramedullary lucie fixation done.  Postoperatively, he was anemic and

found to have urinary tract infection.  He has comorbidities of COPD, coronary

artery disease with prior MI, ischemic cardiomyopathy with ejection fraction 35%,

class I congestive heart failure, hyperlipidemia, generalized weakness, Parkinson

disease, and Lewy body dementia.  He was stabilized and transferred to [facility] under the care of Dr. Bee for PT and OT.  On admission, his

cognitive deficits were significant, but they have gradually gotten better here with

therapy. 



The patient did see Dr. Spain Friday afternoon and was told that he is toe-touch

only on that side because the pin is slipped. 



Physical Therapy has been made aware. 



Wife has multiple questions about can we do something surgically to fix that. 



I told her she would have to contact Dr. Spain's office because he is the

orthopedic surgeon taking care of him. 



OBJECTIVE:  VITAL SIGNS:  Today reveal blood pressure 128/73, pulse 78 to 82,

respirations 18, O2 saturation 96% to 97% on room air, and T-max 97.6. 

GENERAL:  This is a well-developed, well-nourished, thin white male, in no apparent

distress at this time. 

HEENT:  Reveals normocephalic and nontraumatic cranium.  Pupils equally round and

reactive.  Extraocular movements intact.  Nose and throat are slightly dry. 

NECK:  Supple without masses, nodes, or bruits. 

CHEST:  Clear to auscultation.  No rales, rhonchi, wheezes are heard. 

HEART:  Reveals a regular rate and rhythm without murmurs, gallops, or rubs. 

ABDOMEN:  Soft and nontender without organomegaly.  Normal bowel sounds are noted.

No rebound or guarding is noted. 

:  Deferred. 

EXTREMITIES:  Reveal no clubbing, cyanosis, or edema.  Left hip incision looks good.



ASSESSMENT:  

1. Left intertrochanteric fracture, status post surgical fixation.

2. Chronic obstructive pulmonary disease.

3. Coronary artery disease.

4. Ischemic cardiomyopathy.

5. Hyperlipidemia.

6. Parkinson disease.

7. Lewy body dementia.

8. History of recurrent urinary tract infections.

9. Deep venous thrombosis prophylaxis with Lovenox for 8 weeks per orthopedic

surgeon's recommendation. 

10. DNR.



PLAN:  

1. The patient will be toe-touch on that left side secondary to being evaluated at

Dr. Spain's last Friday. 

2. The patient's wife has called Dr. Spain to explain what the other options are.

3. We have ordered labs for tomorrow.

4. Continue present medications.

5. Continue supportive care.

6. Stress ulcer prophylaxis.

7. DVT prophylaxis.

8. Continue decubitus precautions.

9. Continue physical therapy and occupational therapy.







Job ID:  [id number]

## 2020-10-06 LAB
ALBUMIN SERPL BCG-MCNC: 3.6 G/DL (ref 3.4–4.8)
ALP SERPL-CCNC: 172 U/L (ref 40–110)
ALT SERPL W P-5'-P-CCNC: 13 U/L (ref 8–55)
ANION GAP SERPL CALC-SCNC: 12 MMOL/L (ref 10–20)
AST SERPL-CCNC: 16 U/L (ref 5–34)
BASOPHILS # BLD AUTO: 0.1 THOU/UL (ref 0–0.2)
BASOPHILS NFR BLD AUTO: 1.4 % (ref 0–1)
BILIRUB SERPL-MCNC: 1.4 MG/DL (ref 0.2–1.2)
BUN SERPL-MCNC: 22 MG/DL (ref 8.4–25.7)
CALCIUM SERPL-MCNC: 9.3 MG/DL (ref 7.8–10.44)
CHLORIDE SERPL-SCNC: 103 MMOL/L (ref 98–107)
CO2 SERPL-SCNC: 27 MMOL/L (ref 23–31)
CREAT CL PREDICTED SERPL C-G-VRATE: 80 ML/MIN (ref 70–130)
EOSINOPHIL # BLD AUTO: 0.2 THOU/UL (ref 0–0.7)
EOSINOPHIL NFR BLD AUTO: 1.9 % (ref 0–10)
GLOBULIN SER CALC-MCNC: 2.6 G/DL (ref 2.4–3.5)
GLUCOSE SERPL-MCNC: 107 MG/DL (ref 83–110)
HGB BLD-MCNC: 11.6 G/DL (ref 14–18)
LYMPHOCYTES # BLD AUTO: 1.3 THOU/UL (ref 1.2–3.4)
LYMPHOCYTES NFR BLD AUTO: 12.6 % (ref 21–51)
MCH RBC QN AUTO: 29.9 PG (ref 27–31)
MCV RBC AUTO: 99.2 FL (ref 78–98)
MONOCYTES # BLD AUTO: 0.6 THOU/UL (ref 0.11–0.59)
MONOCYTES NFR BLD AUTO: 6.5 % (ref 0–10)
NEUTROPHILS # BLD AUTO: 7.8 THOU/UL (ref 1.4–6.5)
NEUTROPHILS NFR BLD AUTO: 77.7 % (ref 42–75)
PLATELET # BLD AUTO: 531 THOU/UL (ref 130–400)
POTASSIUM SERPL-SCNC: 4.1 MMOL/L (ref 3.5–5.1)
RBC # BLD AUTO: 3.88 MILL/UL (ref 4.7–6.1)
SODIUM SERPL-SCNC: 138 MMOL/L (ref 136–145)
WBC # BLD AUTO: 10 THOU/UL (ref 4.8–10.8)

## 2020-10-06 RX ADMIN — HYDROCODONE BITARTRATE AND ACETAMINOPHEN PRN TAB: 5; 325 TABLET ORAL at 08:18

## 2020-10-06 RX ADMIN — MOMETASONE FUROATE AND FORMOTEROL FUMARATE DIHYDRATE SCH PUFF: 200; 5 AEROSOL RESPIRATORY (INHALATION) at 17:53

## 2020-10-06 RX ADMIN — HYDROCODONE BITARTRATE AND ACETAMINOPHEN PRN TAB: 5; 325 TABLET ORAL at 15:19

## 2020-10-06 RX ADMIN — Medication SCH TAB: at 08:17

## 2020-10-06 RX ADMIN — LACTOBACILLUS ACIDOPH-L.BULGARICUS 1 MILLION CELL CHEWABLE TABLET SCH TAB: at 08:17

## 2020-10-06 RX ADMIN — MOMETASONE FUROATE AND FORMOTEROL FUMARATE DIHYDRATE SCH PUFF: 200; 5 AEROSOL RESPIRATORY (INHALATION) at 05:22

## 2020-10-06 NOTE — PRG
DATE OF SERVICE:  10/06/2020



SUBJECTIVE:  Mr. Canas is a well-developed 69-year-old white male.  He has Lewy body

dementia along with Parkinson disease.  Unfortunately, he fell at home, had a left

intertrochanteric proximal femur fracture.  Dr. Spain took him to surgical suite

and placed an intramedullary lucie fixation.  Postoperatively, he was anemic and found

to have urinary tract infection.  He does have comorbidities of COPD, coronary

artery disease with prior MI, ischemic cardiomyopathy, ejection fraction 35%, class

I congestive heart failure, hyperlipidemia, generalized weakness, Parkinson disease,

and Lewy body dementia.  He was stabilized, transferred to [facility] to Dr. Bee's service for PT and OT.  He is gradually improving with

his cognitive deficits as he is getting his therapy. 



Dr. Spain saw him Friday afternoon and told him he is only toe-touch on that left

hip.  Therapy has been made aware and they are treating him how to transfer.  The

wife has multiple questions for Dr. Spain's office, she will contact him. 



OBJECTIVE:  VITAL SIGNS:  Today reveal blood pressure of 108/66, pulse 78 to 80,

respirations 18 to 20, O2 saturation 96% to 99% on room air, T-max 97.8. 

GENERAL:  On physical exam, this is a well-developed, well-nourished, soft spoken,

white male.  He states he is doing well and states his wife will be here later on. 

HEENT:  Normocephalic and nontraumatic cranium.  Pupils equally round and reactive.

Extraocular movements are intact.  Nose and throat are slightly dry. 

NECK:  Supple without masses, nodes, or bruits. 

CHEST:  Clear to auscultation.  No rales, rhonchi, wheezes are heard. 

HEART:  Reveals a regular rate and rhythm without murmurs, gallops, or rubs. 

ABDOMEN:  Soft, nontender without organomegaly.  Normal bowel sounds are noted in

all 4 quadrants.  No rebound or guarding is noted. 

:  Deferred. 

EXTREMITIES:  Reveal no clubbing, cyanosis, or edema.  Left hip incision is not able

to be seen he has pants on. 



ASSESSMENT:  

1. Left intertrochanteric hip fracture, which is slipped on the pin sticking out,

now is toe-touch only. 

2. Chronic obstructive pulmonary disease.

3. Coronary artery disease.

4. Ischemic cardiomyopathy.

5. Hyperlipidemia.

6. Parkinson disease.

7. Lewy body dementia.

8. History of recurrent urinary tract infection.

9. Deep venous thrombosis prophylaxis with Lovenox for 8 weeks per Orthopedic

Surgery recommendation. 

10. DNR.



PLAN:  

1. The patient will be toe-touch on the left side __________ to be evaluated in Dr. Spain's office last Friday. 

2. The patient's wife will call Dr. Spain's office to find out what the other

options are. 

3. Labs have been ordered for today and actually are back.  His anemia is 9.8 and

27.0.  His BNP is 221.  Creatinine is 0.83, sodium 136, potassium 4.1 __________. 

4. Continue present medications.

5. Continue supportive care.

6. Stress ulcer prophylaxis.

7. DVT prophylaxis.

8. Continue decubitus precautions.

9. Continue PT and OT, but only toe-touch.







Job ID:  [id number]

## 2020-10-07 RX ADMIN — MOMETASONE FUROATE AND FORMOTEROL FUMARATE DIHYDRATE SCH PUFF: 200; 5 AEROSOL RESPIRATORY (INHALATION) at 06:04

## 2020-10-07 RX ADMIN — LACTOBACILLUS ACIDOPH-L.BULGARICUS 1 MILLION CELL CHEWABLE TABLET SCH TAB: at 08:29

## 2020-10-07 RX ADMIN — HYDROCODONE BITARTRATE AND ACETAMINOPHEN PRN TAB: 5; 325 TABLET ORAL at 17:33

## 2020-10-07 RX ADMIN — Medication SCH TAB: at 08:29

## 2020-10-07 RX ADMIN — HYDROCODONE BITARTRATE AND ACETAMINOPHEN PRN TAB: 5; 325 TABLET ORAL at 13:18

## 2020-10-07 RX ADMIN — HYDROCODONE BITARTRATE AND ACETAMINOPHEN PRN TAB: 5; 325 TABLET ORAL at 08:28

## 2020-10-07 RX ADMIN — MOMETASONE FUROATE AND FORMOTEROL FUMARATE DIHYDRATE SCH PUFF: 200; 5 AEROSOL RESPIRATORY (INHALATION) at 17:29

## 2020-10-08 RX ADMIN — HYDROCODONE BITARTRATE AND ACETAMINOPHEN PRN TAB: 5; 325 TABLET ORAL at 20:59

## 2020-10-08 RX ADMIN — MOMETASONE FUROATE AND FORMOTEROL FUMARATE DIHYDRATE SCH PUFF: 200; 5 AEROSOL RESPIRATORY (INHALATION) at 06:13

## 2020-10-08 RX ADMIN — LACTOBACILLUS ACIDOPH-L.BULGARICUS 1 MILLION CELL CHEWABLE TABLET SCH TAB: at 08:21

## 2020-10-08 RX ADMIN — MOMETASONE FUROATE AND FORMOTEROL FUMARATE DIHYDRATE SCH PUFF: 200; 5 AEROSOL RESPIRATORY (INHALATION) at 17:10

## 2020-10-08 RX ADMIN — HYDROCODONE BITARTRATE AND ACETAMINOPHEN PRN TAB: 5; 325 TABLET ORAL at 09:15

## 2020-10-08 RX ADMIN — Medication SCH TAB: at 08:22

## 2020-10-09 RX ADMIN — Medication SCH TAB: at 08:04

## 2020-10-09 RX ADMIN — LACTOBACILLUS ACIDOPH-L.BULGARICUS 1 MILLION CELL CHEWABLE TABLET SCH TAB: at 08:04

## 2020-10-09 RX ADMIN — HYDROCODONE BITARTRATE AND ACETAMINOPHEN PRN TAB: 5; 325 TABLET ORAL at 18:20

## 2020-10-09 RX ADMIN — MOMETASONE FUROATE AND FORMOTEROL FUMARATE DIHYDRATE SCH PUFF: 200; 5 AEROSOL RESPIRATORY (INHALATION) at 05:18

## 2020-10-09 RX ADMIN — MOMETASONE FUROATE AND FORMOTEROL FUMARATE DIHYDRATE SCH PUFF: 200; 5 AEROSOL RESPIRATORY (INHALATION) at 17:54

## 2020-10-10 RX ADMIN — HYDROCODONE BITARTRATE AND ACETAMINOPHEN PRN TAB: 5; 325 TABLET ORAL at 06:53

## 2020-10-10 RX ADMIN — MOMETASONE FUROATE AND FORMOTEROL FUMARATE DIHYDRATE SCH PUFF: 200; 5 AEROSOL RESPIRATORY (INHALATION) at 17:36

## 2020-10-10 RX ADMIN — Medication SCH TAB: at 07:55

## 2020-10-10 RX ADMIN — MOMETASONE FUROATE AND FORMOTEROL FUMARATE DIHYDRATE SCH PUFF: 200; 5 AEROSOL RESPIRATORY (INHALATION) at 05:21

## 2020-10-10 RX ADMIN — LACTOBACILLUS ACIDOPH-L.BULGARICUS 1 MILLION CELL CHEWABLE TABLET SCH TAB: at 07:54

## 2020-10-10 NOTE — PDOC.BPN
- Brief Progress Note


Encounter Date: 10/10/20


Encounter Time: 08:48





SUBJECTIVE: Doing well. No concerns from patient or nursing staff.





OBJECTIVE:


                            Vital Signs - Most Recent











Temp Pulse Resp BP Pulse Ox


 


 98.2 F   79   18   122/71   96 


 


 10/10/20 08:00  10/10/20 08:00  10/10/20 08:00  10/10/20 08:00  10/10/20 08:00








No new labs





PE:


General: well-appearing, NAD


CV: RRR, no murmurs


Resp: CTAB


ABD: soft, nontender.


Ext: no cyanosis, or clubbing. L hip incision c/d/i.





ASSESSMENT:


1. L hip fracture s/p pinning


2. COPD


3. CAD


4. Ischemic cardiomyopathy


5. Hyperlipidemia


6. Parkinson Disease


7. Lewy Body dementia


9. DVT prophy with Lovenox for 8 weeks per Ortho


10. DNR





PLAN:


-Patient is non-weightbearing per Dr. Mckinnon's office, but wife called office, 

and got patient to be weightbearing as tolerated.


-Continue current rx


-Stress ulcer and DVT prophyx


-Continue PT/OT

## 2020-10-11 LAB
ANION GAP SERPL CALC-SCNC: 14 MMOL/L (ref 10–20)
BASOPHILS # BLD AUTO: 0.1 THOU/UL (ref 0–0.2)
BASOPHILS NFR BLD AUTO: 0.7 % (ref 0–1)
BUN SERPL-MCNC: 22 MG/DL (ref 8.4–25.7)
CALCIUM SERPL-MCNC: 9.2 MG/DL (ref 7.8–10.44)
CHLORIDE SERPL-SCNC: 104 MMOL/L (ref 98–107)
CO2 SERPL-SCNC: 26 MMOL/L (ref 23–31)
CREAT CL PREDICTED SERPL C-G-VRATE: 78 ML/MIN (ref 70–130)
EOSINOPHIL # BLD AUTO: 0.3 THOU/UL (ref 0–0.7)
EOSINOPHIL NFR BLD AUTO: 2.9 % (ref 0–10)
GLUCOSE SERPL-MCNC: 105 MG/DL (ref 83–110)
HGB BLD-MCNC: 12.1 G/DL (ref 14–18)
LYMPHOCYTES # BLD AUTO: 1.2 THOU/UL (ref 1.2–3.4)
LYMPHOCYTES NFR BLD AUTO: 12.3 % (ref 21–51)
MCH RBC QN AUTO: 30.4 PG (ref 27–31)
MCV RBC AUTO: 99.9 FL (ref 78–98)
MONOCYTES # BLD AUTO: 0.7 THOU/UL (ref 0.11–0.59)
MONOCYTES NFR BLD AUTO: 7.6 % (ref 0–10)
NEUTROPHILS # BLD AUTO: 7.1 THOU/UL (ref 1.4–6.5)
NEUTROPHILS NFR BLD AUTO: 76.5 % (ref 42–75)
PLATELET # BLD AUTO: 437 THOU/UL (ref 130–400)
POTASSIUM SERPL-SCNC: 3.8 MMOL/L (ref 3.5–5.1)
RBC # BLD AUTO: 3.98 MILL/UL (ref 4.7–6.1)
SODIUM SERPL-SCNC: 140 MMOL/L (ref 136–145)
WBC # BLD AUTO: 9.3 THOU/UL (ref 4.8–10.8)

## 2020-10-11 RX ADMIN — Medication SCH TAB: at 08:14

## 2020-10-11 RX ADMIN — MOMETASONE FUROATE AND FORMOTEROL FUMARATE DIHYDRATE SCH PUFF: 200; 5 AEROSOL RESPIRATORY (INHALATION) at 18:19

## 2020-10-11 RX ADMIN — LACTOBACILLUS ACIDOPH-L.BULGARICUS 1 MILLION CELL CHEWABLE TABLET SCH TAB: at 08:13

## 2020-10-11 RX ADMIN — HYDROCODONE BITARTRATE AND ACETAMINOPHEN PRN TAB: 5; 325 TABLET ORAL at 08:15

## 2020-10-11 RX ADMIN — MOMETASONE FUROATE AND FORMOTEROL FUMARATE DIHYDRATE SCH PUFF: 200; 5 AEROSOL RESPIRATORY (INHALATION) at 06:03

## 2020-10-11 NOTE — RAD
Chest one view



HISTORY: Chest pain.



FINDINGS: Cardiac silhouette and pulmonary vasculature are unremarkable. Mediastinum is midline with 
postoperative changes, aortic calcification, and a multi lead left subclavian cardiac electronic

device.



No lobar consolidation or evidence of pneumothorax.



  



IMPRESSION :

No active cardiopulmonary abnormalities are demonstrated.



Atherosclerosis.



Reported By: MARV Nevarez 

Electronically Signed:  10/11/2020 1:44 PM

## 2020-10-12 RX ADMIN — HYDROCODONE BITARTRATE AND ACETAMINOPHEN PRN TAB: 5; 325 TABLET ORAL at 15:07

## 2020-10-12 RX ADMIN — Medication SCH TAB: at 08:50

## 2020-10-12 RX ADMIN — LACTOBACILLUS ACIDOPH-L.BULGARICUS 1 MILLION CELL CHEWABLE TABLET SCH TAB: at 08:51

## 2020-10-12 RX ADMIN — MOMETASONE FUROATE AND FORMOTEROL FUMARATE DIHYDRATE SCH PUFF: 200; 5 AEROSOL RESPIRATORY (INHALATION) at 18:14

## 2020-10-12 RX ADMIN — MOMETASONE FUROATE AND FORMOTEROL FUMARATE DIHYDRATE SCH PUFF: 200; 5 AEROSOL RESPIRATORY (INHALATION) at 06:01

## 2020-10-13 RX ADMIN — Medication SCH TAB: at 08:22

## 2020-10-13 RX ADMIN — LACTOBACILLUS ACIDOPH-L.BULGARICUS 1 MILLION CELL CHEWABLE TABLET SCH TAB: at 08:22

## 2020-10-13 RX ADMIN — MOMETASONE FUROATE AND FORMOTEROL FUMARATE DIHYDRATE SCH PUFF: 200; 5 AEROSOL RESPIRATORY (INHALATION) at 17:13

## 2020-10-13 RX ADMIN — HYDROCODONE BITARTRATE AND ACETAMINOPHEN PRN TAB: 5; 325 TABLET ORAL at 21:30

## 2020-10-13 RX ADMIN — MOMETASONE FUROATE AND FORMOTEROL FUMARATE DIHYDRATE SCH PUFF: 200; 5 AEROSOL RESPIRATORY (INHALATION) at 06:18

## 2020-10-14 RX ADMIN — Medication SCH TAB: at 08:26

## 2020-10-14 RX ADMIN — LACTOBACILLUS ACIDOPH-L.BULGARICUS 1 MILLION CELL CHEWABLE TABLET SCH TAB: at 08:26

## 2020-10-14 RX ADMIN — MOMETASONE FUROATE AND FORMOTEROL FUMARATE DIHYDRATE SCH PUFF: 200; 5 AEROSOL RESPIRATORY (INHALATION) at 17:36

## 2020-10-14 RX ADMIN — HYDROCODONE BITARTRATE AND ACETAMINOPHEN PRN TAB: 5; 325 TABLET ORAL at 08:25

## 2020-10-14 RX ADMIN — MOMETASONE FUROATE AND FORMOTEROL FUMARATE DIHYDRATE SCH PUFF: 200; 5 AEROSOL RESPIRATORY (INHALATION) at 06:23

## 2020-10-15 RX ADMIN — MOMETASONE FUROATE AND FORMOTEROL FUMARATE DIHYDRATE SCH PUFF: 200; 5 AEROSOL RESPIRATORY (INHALATION) at 17:37

## 2020-10-15 RX ADMIN — MOMETASONE FUROATE AND FORMOTEROL FUMARATE DIHYDRATE SCH PUFF: 200; 5 AEROSOL RESPIRATORY (INHALATION) at 06:01

## 2020-10-15 RX ADMIN — Medication SCH TAB: at 08:00

## 2020-10-15 RX ADMIN — LACTOBACILLUS ACIDOPH-L.BULGARICUS 1 MILLION CELL CHEWABLE TABLET SCH TAB: at 08:00

## 2020-10-15 RX ADMIN — HYDROCODONE BITARTRATE AND ACETAMINOPHEN PRN TAB: 5; 325 TABLET ORAL at 07:59

## 2020-10-16 RX ADMIN — Medication SCH TAB: at 08:14

## 2020-10-16 RX ADMIN — LACTOBACILLUS ACIDOPH-L.BULGARICUS 1 MILLION CELL CHEWABLE TABLET SCH TAB: at 08:15

## 2020-10-16 RX ADMIN — MOMETASONE FUROATE AND FORMOTEROL FUMARATE DIHYDRATE SCH PUFF: 200; 5 AEROSOL RESPIRATORY (INHALATION) at 05:42

## 2020-10-16 RX ADMIN — MOMETASONE FUROATE AND FORMOTEROL FUMARATE DIHYDRATE SCH PUFF: 200; 5 AEROSOL RESPIRATORY (INHALATION) at 18:15

## 2020-10-17 RX ADMIN — MOMETASONE FUROATE AND FORMOTEROL FUMARATE DIHYDRATE SCH PUFF: 200; 5 AEROSOL RESPIRATORY (INHALATION) at 17:37

## 2020-10-17 RX ADMIN — MOMETASONE FUROATE AND FORMOTEROL FUMARATE DIHYDRATE SCH PUFF: 200; 5 AEROSOL RESPIRATORY (INHALATION) at 05:11

## 2020-10-17 RX ADMIN — LACTOBACILLUS ACIDOPH-L.BULGARICUS 1 MILLION CELL CHEWABLE TABLET SCH TAB: at 08:08

## 2020-10-17 RX ADMIN — Medication SCH TAB: at 08:07

## 2020-10-17 NOTE — PRG
DATE OF SERVICE:  10/17/2020



SUBJECTIVE:  Mr. Canas is up in his recliner.  He denies any questions or concerns,

still has a flat affect.  No family at bedside. 



OBJECTIVE:  VITAL SIGNS:  He is afebrile, heart rate 80, respirations are 20, oxygen

saturation 97% on room air, and blood pressure 113/67. 

CARDIOVASCULAR SYSTEM:  S1 and S2 plus. 

RESPIRATORY SYSTEM:  Normal vesicular breath sounds. 

ABDOMEN:  Soft, nontender, and bowel sounds heard in all quadrants. 

EXTREMITIES:  Without cyanosis or clubbing. 

CENTRAL NERVOUS SYSTEM:  Generalized weakness.  Otherwise nonfocal.



IMPRESSION:  

1. __________ intertrochanteric fracture, status post surgical fixation.

2. Chronic obstructive pulmonary disease.

3. Coronary artery disease.

4. Dyslipidemia.

5. Parkinson disease with Lewy body dementia and deconditioning.



PLAN:  

1. Continue current medications.

2. Heart healthy diet.

3. Orthopedic precautions.

4. DVT prophylaxis with PlexiPulses.

5. Decubitus precautions.

6. Physical therapy.

7. Routine laboratory values.







Job ID:  [id number]

## 2020-10-18 RX ADMIN — LACTOBACILLUS ACIDOPH-L.BULGARICUS 1 MILLION CELL CHEWABLE TABLET SCH TAB: at 08:00

## 2020-10-18 RX ADMIN — HYDROCODONE BITARTRATE AND ACETAMINOPHEN PRN TAB: 5; 325 TABLET ORAL at 07:59

## 2020-10-18 RX ADMIN — MOMETASONE FUROATE AND FORMOTEROL FUMARATE DIHYDRATE SCH PUFF: 200; 5 AEROSOL RESPIRATORY (INHALATION) at 05:48

## 2020-10-18 RX ADMIN — Medication SCH TAB: at 08:00

## 2020-10-18 RX ADMIN — MOMETASONE FUROATE AND FORMOTEROL FUMARATE DIHYDRATE SCH PUFF: 200; 5 AEROSOL RESPIRATORY (INHALATION) at 17:32

## 2020-10-18 NOTE — PRG
DATE OF SERVICE:  10/18/2020



SUBJECTIVE:  Mr. Canas is doing the same.  Flat affect.  Denies any questions or

concerns.  Discussed with nursing.  No family at bedside. 



OBJECTIVE:  VITAL SIGNS:  He is afebrile.  Heart rate 78, respirations 15, oxygen

saturation 95% on room air, blood pressure 122/70. 

CARDIOVASCULAR SYSTEM:  S1, S2 plus. 

RESPIRATORY SYSTEM:  Normal vesicular breath sounds. 

ABDOMEN:  Soft and nontender.  Bowel sounds heard in all quadrants. 

EXTREMITIES:  Without cyanosis or clubbing. 

CENTRAL NERVOUS SYSTEM:  Flat affect.  Generalized weakness.  Mild cognitive

deficits.  Otherwise nonfocal. 



IMPRESSION:  

1. Surgical fixation of the left hip fracture.

2. Chronic obstructive pulmonary disease.

3. Coronary artery disease.

4. Dyslipidemia.

5. Parkinson disease with Lewy body dementia.

6. Deconditioning.



PLAN:  

1. Continue current medications.

2. Orthopedic precautions.

3. Heart healthy diet.

4. DVT prophylaxis, he is on Lovenox.

5. Decubitus precautions.

6. Stress ulcer prophylaxis.

7. Routine laboratory values.

8. Dr. Rohit alexander [facility].





Job ID:  [id number]

## 2020-10-19 LAB
ALBUMIN SERPL BCG-MCNC: 3.4 G/DL (ref 3.4–4.8)
ALP SERPL-CCNC: 135 U/L (ref 40–110)
ALT SERPL W P-5'-P-CCNC: 15 U/L (ref 8–55)
ANION GAP SERPL CALC-SCNC: 12 MMOL/L (ref 10–20)
AST SERPL-CCNC: 17 U/L (ref 5–34)
BILIRUB SERPL-MCNC: 0.8 MG/DL (ref 0.2–1.2)
BUN SERPL-MCNC: 19 MG/DL (ref 8.4–25.7)
CALCIUM SERPL-MCNC: 9.1 MG/DL (ref 7.8–10.44)
CHLORIDE SERPL-SCNC: 100 MMOL/L (ref 98–107)
CO2 SERPL-SCNC: 29 MMOL/L (ref 23–31)
CREAT CL PREDICTED SERPL C-G-VRATE: 66 ML/MIN (ref 70–130)
GLOBULIN SER CALC-MCNC: 2.6 G/DL (ref 2.4–3.5)
GLUCOSE SERPL-MCNC: 109 MG/DL (ref 83–110)
POTASSIUM SERPL-SCNC: 4.1 MMOL/L (ref 3.5–5.1)
SODIUM SERPL-SCNC: 137 MMOL/L (ref 136–145)

## 2020-10-19 RX ADMIN — MOMETASONE FUROATE AND FORMOTEROL FUMARATE DIHYDRATE SCH PUFF: 200; 5 AEROSOL RESPIRATORY (INHALATION) at 05:15

## 2020-10-19 RX ADMIN — Medication SCH TAB: at 08:08

## 2020-10-19 RX ADMIN — LACTOBACILLUS ACIDOPH-L.BULGARICUS 1 MILLION CELL CHEWABLE TABLET SCH TAB: at 08:08

## 2020-10-19 RX ADMIN — MOMETASONE FUROATE AND FORMOTEROL FUMARATE DIHYDRATE SCH PUFF: 200; 5 AEROSOL RESPIRATORY (INHALATION) at 17:30

## 2020-10-19 NOTE — PRG
DATE OF SERVICE:  10/16/2020



SUBJECTIVE:  The patient lying in the bed, resting after therapy, but did apparently

cooperate today. 



OBJECTIVE:  VITAL SIGNS:  Show temperature is 98.6, pulse 80, respirations 20, O2

sats 98% on room air, blood pressure 118/67. 

NEUROLOGICAL:  Shows no focal findings. 

LUNGS:  Clear. 

CARDIAC:  Shows regular rhythm.



ASSESSMENT:  

1. Resolving left total hip incision and open reduction and internal fixation of hip

fracture. 

2. Stable Lewy body dementia with no delirium, but with persistent confusion.

3. Ischemic cardiomyopathy, asymptomatic.

4. Parkinson disease, stable.

5. Deconditioning, improving slowly.



PLAN:  

1. Continue PT, OT.

2. Continue stress ulcer and DVT prophylaxis.

3. Continue to monitor nutrition.







Job ID:  [id number]

## 2020-10-19 NOTE — PRG
DATE OF SERVICE:  10/12/2020



SUBJECTIVE:  The patient is lying in bed, in no distress.  Responds appropriately to

questions very minimally.  Remains confused.  Having no shortness of breath.  Has

been attempting to cooperate with therapy. 



OBJECTIVE:  VITAL SIGNS:  Temperature 97, pulse 80, respirations 20, O2 sats 97% on

room air, blood pressure 121/69. 

LUNGS: Clear. 

CARDIAC: Regular rhythm. 

ABDOMEN:  Soft and nontender. 

SKIN/EXTREMITIES:  No edema, clubbing, or cyanosis.  Left hip incision is clean and

dry. 



LABORATORY DATA:  Laboratories yesterday showed a white count of 9300, hematocrit

39, hemoglobin 12.  Sodium 140, potassium 3.8, chloride 104, bicarb 26, BUN 22,

creatinine 0.81. 



ASSESSMENT:  

1. Resolving pinning of left intertrochanteric fracture.

2. Stable chronic obstructive pulmonary disease.

3. Coronary artery disease with ischemic cardiomyopathy with no chest pain.

4. Lewy body dementia, stable with no agitation, but persistent confusion.

5. Hyperlipidemia, stable.



PLAN:  

1. Continue PT, OT.

2. Continue stress ulcer and DVT prophylaxis.

3. Continue pain relief as needed.

4. Continue to monitor dietary intake.







Job ID:  [id number]

## 2020-10-19 NOTE — PRG
DATE OF SERVICE:  10/19/2020



SUBJECTIVE:  The patient lies in bed rest after therapy, awake and responds to

questions minimally.  No apparent distress. 



OBJECTIVE:  VITAL SIGNS:  Show him to have temperature of 98, pulse 79, respirations

18, O2 saturations 95% on room air __________. 

LUNGS:  Clear. 

CARDIAC:  Shows regular rhythm. 

ABDOMEN:  Soft and nontender.



ASSESSMENT:  

1. Resolving left hip open reduction and internal fixation.

2. Stable Lewy body dementia.

3. Decreased blood pressure, possibly dehydration.  We will check labs in the a.m.

4. Ischemic cardiomyopathy, asymptomatic.



PLAN:  

1. Check CBC and comp met in the a.m.

2. Continue PT, OT, and discuss discharge plans with therapy.

3. Continue to monitor for ischemia __________.







Job ID:  [id number]

## 2020-10-19 NOTE — PRG
DATE OF SERVICE:  10/13/2020



SUBJECTIVE:  The patient feels well.  No complaints.  Wife not in the room.  Nurses

have no complaints.  He is eating well and cooperating somewhat. 



OBJECTIVE:  LUNGS:  Clear. 

CARDIAC:  Shows regular rhythm. 

ABDOMEN:  Soft, nontender, left hip incision healing well.



ASSESSMENT:  

1. Resolving left hip, status post open reduction and internal fixation.

2. Stable chronic obstructive pulmonary disease.

3. Stable coronary artery disease and ischemic cardiomyopathy.

4. Lewy body dementia with more alertness, but persistent confusion.



PLAN:  

1. Continue PT/OT.

2. Continue stress ulcer and DVT prophylaxis.

3. Continue pain relief as needed with nonsteroidals.

4. Continue to monitor for recurrent angina or congestive heart failure.







Job ID:  [id number]

## 2020-10-20 LAB
BASOPHILS # BLD AUTO: 0.1 THOU/UL (ref 0–0.2)
BASOPHILS NFR BLD AUTO: 0.7 % (ref 0–1)
EOSINOPHIL # BLD AUTO: 0.3 THOU/UL (ref 0–0.7)
EOSINOPHIL NFR BLD AUTO: 2.7 % (ref 0–10)
HGB BLD-MCNC: 12.1 G/DL (ref 14–18)
LYMPHOCYTES # BLD AUTO: 1.2 THOU/UL (ref 1.2–3.4)
LYMPHOCYTES NFR BLD AUTO: 13.1 % (ref 21–51)
MCH RBC QN AUTO: 30.3 PG (ref 27–31)
MCV RBC AUTO: 99 FL (ref 78–98)
MONOCYTES # BLD AUTO: 0.7 THOU/UL (ref 0.11–0.59)
MONOCYTES NFR BLD AUTO: 7.6 % (ref 0–10)
NEUTROPHILS # BLD AUTO: 7.1 THOU/UL (ref 1.4–6.5)
NEUTROPHILS NFR BLD AUTO: 75.9 % (ref 42–75)
PLATELET # BLD AUTO: 485 THOU/UL (ref 130–400)
RBC # BLD AUTO: 4 MILL/UL (ref 4.7–6.1)
WBC # BLD AUTO: 9.3 THOU/UL (ref 4.8–10.8)

## 2020-10-20 RX ADMIN — MOMETASONE FUROATE AND FORMOTEROL FUMARATE DIHYDRATE SCH PUFF: 200; 5 AEROSOL RESPIRATORY (INHALATION) at 17:37

## 2020-10-20 RX ADMIN — LACTOBACILLUS ACIDOPH-L.BULGARICUS 1 MILLION CELL CHEWABLE TABLET SCH TAB: at 08:52

## 2020-10-20 RX ADMIN — Medication SCH TAB: at 08:52

## 2020-10-20 RX ADMIN — MOMETASONE FUROATE AND FORMOTEROL FUMARATE DIHYDRATE SCH PUFF: 200; 5 AEROSOL RESPIRATORY (INHALATION) at 05:49

## 2020-10-21 RX ADMIN — MOMETASONE FUROATE AND FORMOTEROL FUMARATE DIHYDRATE SCH PUFF: 200; 5 AEROSOL RESPIRATORY (INHALATION) at 18:09

## 2020-10-21 RX ADMIN — LACTOBACILLUS ACIDOPH-L.BULGARICUS 1 MILLION CELL CHEWABLE TABLET SCH TAB: at 08:08

## 2020-10-21 RX ADMIN — Medication SCH TAB: at 08:10

## 2020-10-21 RX ADMIN — MOMETASONE FUROATE AND FORMOTEROL FUMARATE DIHYDRATE SCH PUFF: 200; 5 AEROSOL RESPIRATORY (INHALATION) at 05:24

## 2020-10-22 RX ADMIN — MOMETASONE FUROATE AND FORMOTEROL FUMARATE DIHYDRATE SCH PUFF: 200; 5 AEROSOL RESPIRATORY (INHALATION) at 17:58

## 2020-10-22 RX ADMIN — LACTOBACILLUS ACIDOPH-L.BULGARICUS 1 MILLION CELL CHEWABLE TABLET SCH TAB: at 08:14

## 2020-10-22 RX ADMIN — Medication SCH TAB: at 08:15

## 2020-10-22 RX ADMIN — MOMETASONE FUROATE AND FORMOTEROL FUMARATE DIHYDRATE SCH PUFF: 200; 5 AEROSOL RESPIRATORY (INHALATION) at 06:06

## 2020-10-22 NOTE — PRG
DATE OF SERVICE:  10/20/2020



SUBJECTIVE:  The patient is lying in bed, resting after therapy.  Family is not in

the room.  Therapy as stated the patient is stabilizing on his progress and need to

consider home health care or palliative care as he is still a two person assist. 



OBJECTIVE:  VITAL SIGNS:  Shows temperature is 98, pulse 80, respirations 20, O2

saturations 95% on room air, and blood pressure is 97/54. 

LUNGS:  Clear. 

CARDIAC:  Shows regular rhythm. 

ABDOMEN:  Soft and nontender. 

EXTREMITIES:  Left lateral hip incision healing well.



ASSESSMENT:  

1. Resolving open reduction and internal fixation of left hip fracture.

2. Stable chronic obstructive pulmonary disease.

3. Persistent significant Lewy body dementia with no agitation, but still poor

cooperation. 

4. Deconditioning, improving slightly, but still two person assist.

5. Coronary artery disease asymptomatic.



PLAN:  

1. Continue PT and OT.

2. Continue DVT and stress ulcer prophylaxis.

3. Review labs and Therapy notes.

4. Monitor vital signs with Therapy.

5. Discuss discharge planning with Therapy and wife.







Job ID:  [id number]

## 2020-10-22 NOTE — PRG
DATE OF SERVICE:  10/22/2020



SUBJECTIVE:  The patient is sitting up eating his supper, responds to questions.

Still very flat affect, appears to be stronger, but therapy states he is still a 2

person assist.  Wife is not in the room. 



OBJECTIVE:  VITAL SIGNS:  Shows temperature is 96, pulse 80, respirations 20, O2

sats 97% on room air, blood pressure is 104/71. 



LABORATORY DATA:  Recent laboratory showed comprehensive metabolic profile, CBC

stable within normal limits. 



ASSESSMENT:  

1. Stable Lewy body dementia with minimal progress.

2. Resolving left hip open reduction and internal fixation.

3. Deconditioning, improving slowly.

4. Chronic obstructive pulmonary disease, asymptomatic.

5. Coronary artery disease, asymptomatic.



PLAN:  

1. Discuss personally with PT, OT, and with family.  Continue PT and OT.  

2. Probably begin to prepare for discharge home.







Job ID:  [id number]

## 2020-10-23 RX ADMIN — MOMETASONE FUROATE AND FORMOTEROL FUMARATE DIHYDRATE SCH PUFF: 200; 5 AEROSOL RESPIRATORY (INHALATION) at 17:33

## 2020-10-23 RX ADMIN — LACTOBACILLUS ACIDOPH-L.BULGARICUS 1 MILLION CELL CHEWABLE TABLET SCH TAB: at 08:31

## 2020-10-23 RX ADMIN — Medication SCH TAB: at 08:29

## 2020-10-23 RX ADMIN — MOMETASONE FUROATE AND FORMOTEROL FUMARATE DIHYDRATE SCH PUFF: 200; 5 AEROSOL RESPIRATORY (INHALATION) at 05:39

## 2020-10-23 NOTE — PRG
DATE OF SERVICE:  10/23/2020



SUBJECTIVE:  The patient is sitting up in a chair, visiting wife, has been somewhat

confused today, but has been walking in the [place], had a long discussion with wife

and son about discharge planning. 



__________ confusion about followup as he will be followed by palliative care,

Masoud Lizama; however, therapy states that he is still walking and

improving, and we will probably plan for discharge next week and the family

understand this. 



OBJECTIVE:  VITAL SIGNS:  Shows temperature is 96, pulse 81, respirations 20, O2

sats 98% on room air, blood pressure is 131/74. 

LUNGS:  Clear. 

CARDIAC:  Shows regular rhythm. 

ABDOMEN:  Soft and nontender. 

NEUROLOGIC:  Shows no focal findings.  Left lateral hip incision healed well.



ASSESSMENT:  

1. Resolved left hip fracture, status post open reduction and internal fixation

__________. 

2. Stable Lewy body dementia with some possible slight improvement.

3. Deconditioning, improving greatly.

4. Parkinson disease, stable.

5. Ischemic cardiomyopathy, stable.



PLAN:  Continue PT, OT. 



Plan for discharge next week. 



Discussed in detail with family and they understand.







Job ID:  [id number]

## 2020-10-24 RX ADMIN — MOMETASONE FUROATE AND FORMOTEROL FUMARATE DIHYDRATE SCH PUFF: 200; 5 AEROSOL RESPIRATORY (INHALATION) at 17:55

## 2020-10-24 RX ADMIN — Medication SCH TAB: at 09:45

## 2020-10-24 RX ADMIN — LACTOBACILLUS ACIDOPH-L.BULGARICUS 1 MILLION CELL CHEWABLE TABLET SCH TAB: at 09:45

## 2020-10-24 RX ADMIN — MOMETASONE FUROATE AND FORMOTEROL FUMARATE DIHYDRATE SCH PUFF: 200; 5 AEROSOL RESPIRATORY (INHALATION) at 05:51

## 2020-10-25 LAB
ANION GAP SERPL CALC-SCNC: 11 MMOL/L (ref 10–20)
BASOPHILS # BLD AUTO: 0.1 THOU/UL (ref 0–0.2)
BASOPHILS NFR BLD AUTO: 1 % (ref 0–1)
BUN SERPL-MCNC: 20 MG/DL (ref 8.4–25.7)
CALCIUM SERPL-MCNC: 9.3 MG/DL (ref 7.8–10.44)
CHLORIDE SERPL-SCNC: 102 MMOL/L (ref 98–107)
CO2 SERPL-SCNC: 29 MMOL/L (ref 23–31)
CREAT CL PREDICTED SERPL C-G-VRATE: 81 ML/MIN (ref 70–130)
EOSINOPHIL # BLD AUTO: 0.2 THOU/UL (ref 0–0.7)
EOSINOPHIL NFR BLD AUTO: 1.9 % (ref 0–10)
GLUCOSE SERPL-MCNC: 102 MG/DL (ref 83–110)
HGB BLD-MCNC: 13.1 G/DL (ref 14–18)
LYMPHOCYTES # BLD AUTO: 1.2 THOU/UL (ref 1.2–3.4)
LYMPHOCYTES NFR BLD AUTO: 10.6 % (ref 21–51)
MCH RBC QN AUTO: 32 PG (ref 27–31)
MCV RBC AUTO: 99.6 FL (ref 78–98)
MONOCYTES # BLD AUTO: 0.9 THOU/UL (ref 0.11–0.59)
MONOCYTES NFR BLD AUTO: 8.1 % (ref 0–10)
NEUTROPHILS # BLD AUTO: 8.7 THOU/UL (ref 1.4–6.5)
NEUTROPHILS NFR BLD AUTO: 78.4 % (ref 42–75)
PLATELET # BLD AUTO: 478 THOU/UL (ref 130–400)
POTASSIUM SERPL-SCNC: 4 MMOL/L (ref 3.5–5.1)
RBC # BLD AUTO: 4.1 MILL/UL (ref 4.7–6.1)
SODIUM SERPL-SCNC: 138 MMOL/L (ref 136–145)
WBC # BLD AUTO: 11.1 THOU/UL (ref 4.8–10.8)

## 2020-10-25 RX ADMIN — Medication SCH TAB: at 08:30

## 2020-10-25 RX ADMIN — MOMETASONE FUROATE AND FORMOTEROL FUMARATE DIHYDRATE SCH PUFF: 200; 5 AEROSOL RESPIRATORY (INHALATION) at 05:42

## 2020-10-25 RX ADMIN — MOMETASONE FUROATE AND FORMOTEROL FUMARATE DIHYDRATE SCH PUFF: 200; 5 AEROSOL RESPIRATORY (INHALATION) at 17:44

## 2020-10-25 RX ADMIN — LACTOBACILLUS ACIDOPH-L.BULGARICUS 1 MILLION CELL CHEWABLE TABLET SCH TAB: at 08:29

## 2020-10-26 RX ADMIN — MOMETASONE FUROATE AND FORMOTEROL FUMARATE DIHYDRATE SCH PUFF: 200; 5 AEROSOL RESPIRATORY (INHALATION) at 17:39

## 2020-10-26 RX ADMIN — MOMETASONE FUROATE AND FORMOTEROL FUMARATE DIHYDRATE SCH PUFF: 200; 5 AEROSOL RESPIRATORY (INHALATION) at 05:51

## 2020-10-26 RX ADMIN — Medication SCH TAB: at 08:29

## 2020-10-26 RX ADMIN — LACTOBACILLUS ACIDOPH-L.BULGARICUS 1 MILLION CELL CHEWABLE TABLET SCH TAB: at 08:28

## 2020-10-26 NOTE — PRG
DATE OF SERVICE:  10/26/2020



SUBJECTIVE:  The patient is cooperating with therapy and walking somewhat, but is

exhausted and resting at this time in bed.  He is responding appropriately to

questions, although slowly.  He is having no problems with swallowing or eating. 



OBJECTIVE:  VITAL SIGNS:  Temperature is 96.4, pulse 72, respirations 18, O2 sats

96% on room air, blood pressure 133/63. 

LUNGS:  Clear. 

__________ left lateral hip incision healing well.



ASSESSMENT:  

1. Resolving left hip fracture, status post open reduction and internal fixation.

2. Stable Parkinson disease.

3. Slowly progressive Lewy body dementia.

4. Deconditioning, improving greatly.



PLAN:  

1. Continue __________.

2. Await to follow up with neurologist.

3. Continue to monitor oral intake.

4. Prepare for discharge.







Job ID:  [id number]

## 2020-10-27 RX ADMIN — LACTOBACILLUS ACIDOPH-L.BULGARICUS 1 MILLION CELL CHEWABLE TABLET SCH TAB: at 08:09

## 2020-10-27 RX ADMIN — MOMETASONE FUROATE AND FORMOTEROL FUMARATE DIHYDRATE SCH PUFF: 200; 5 AEROSOL RESPIRATORY (INHALATION) at 06:06

## 2020-10-27 RX ADMIN — Medication SCH TAB: at 08:10

## 2020-10-27 RX ADMIN — MOMETASONE FUROATE AND FORMOTEROL FUMARATE DIHYDRATE SCH PUFF: 200; 5 AEROSOL RESPIRATORY (INHALATION) at 17:09

## 2020-10-27 NOTE — CT
CT head noncontrast



HISTORY: Fall. Injury.



COMPARISON: 4/22/2017.



FINDINGS: There is no evidence of acute intracranial hemorrhage or infarct. The ventricles appear nor
mal in size, shape and position. Mild diffuse cortical atrophy and chronic ischemic small vessel

disease are stable. Hyperdense polyp partially visualized within the left maxillary sinus.



Prominent rotation of the patient and lack of correction on the reformatted images compromises exam.



  



IMPRESSION :

Chronic-type findings are stable. No acute intracranial abnormalities are demonstrated.



Reported By: MARV Nevarez 

Electronically Signed:  10/27/2020 5:23 PM

## 2020-10-28 RX ADMIN — MOMETASONE FUROATE AND FORMOTEROL FUMARATE DIHYDRATE SCH PUFF: 200; 5 AEROSOL RESPIRATORY (INHALATION) at 05:51

## 2020-10-28 RX ADMIN — LACTOBACILLUS ACIDOPH-L.BULGARICUS 1 MILLION CELL CHEWABLE TABLET SCH TAB: at 09:22

## 2020-10-28 RX ADMIN — MOMETASONE FUROATE AND FORMOTEROL FUMARATE DIHYDRATE SCH PUFF: 200; 5 AEROSOL RESPIRATORY (INHALATION) at 17:59

## 2020-10-28 RX ADMIN — Medication SCH TAB: at 09:24

## 2020-10-28 NOTE — PRG
DATE OF SERVICE:  10/27/2020



SUBJECTIVE:  The patient is lying in bed, visiting his grandson, appears to be

stable despite recent fall with no evident trauma to his head. 



OBJECTIVE:  HEAD:  Shows CT scan of the brain, normal limits. 

NEUROLOGIC:  Shows persistent flat affect with some bradykinesia.  Strength appears

to be slightly improved as patient is cooperating with therapy but is planning on

discharging home in the next several days and we will discuss hospice with family

and they will make decision tonight after discussion with hospice. 



ASSESSMENT:  

1. Lewy body dementia, stable with minimal progression recently.

2. Left hip fracture, resolving pain.

3. Chronic obstructive pulmonary disease, asymptomatic.

4. Coronary artery disease, asymptomatic.



PLAN:  

1. Discussed discharge planning with family and hospice.

2. Continue PT/OT.

3. Continue to monitor for falls.

4. Continue stress ulcer prophylaxis and DVT prophylaxis, but may discontinue

Lovenox because of fall. 







Job ID:  [id number]

## 2020-10-28 NOTE — RAD
EXAM: 2 views of the left hip



HISTORY: Left femur fracture repair and left hip pain



COMPARISON: None



FINDINGS: 2 views of the left hip shows the patient is status post ORIF of an intertrochanteric left 
femur fracture. No perihardware lucency is seen. No degenerative changes are seen. No soft tissue

swelling is present.



IMPRESSION: Status post ORIF of femur fracture without evidence of complication.



Reported By: Ignacio Gonzalez 

Electronically Signed:  10/28/2020 10:09 AM

## 2020-10-29 RX ADMIN — MOMETASONE FUROATE AND FORMOTEROL FUMARATE DIHYDRATE SCH PUFF: 200; 5 AEROSOL RESPIRATORY (INHALATION) at 18:20

## 2020-10-29 RX ADMIN — MOMETASONE FUROATE AND FORMOTEROL FUMARATE DIHYDRATE SCH PUFF: 200; 5 AEROSOL RESPIRATORY (INHALATION) at 06:10

## 2020-10-29 RX ADMIN — LACTOBACILLUS ACIDOPH-L.BULGARICUS 1 MILLION CELL CHEWABLE TABLET SCH TAB: at 08:50

## 2020-10-29 RX ADMIN — Medication SCH TAB: at 08:49

## 2020-10-29 NOTE — PRG
DATE OF SERVICE:  10/28/2020



SUBJECTIVE:  The patient lying in bed exhausted after doing therapy today.  He,

however, is having no sequelae from his fall yesterday with negative CT of his brain

and negative x-ray of his hip.  The wife is somewhat upset about his followup, but

understands.  She has multiple questions about hospice, palliative care and home

health and ultimately has decided to go home on [facility] and is requesting

a hospital bed, which I feel he requires because of his significant Parkinson

disease and need to move him into different positions and also because of the need

to change the height of the bed in order to __________.  He also needs a walker to

help him ambulate as he is only walking with a walker.  She is also requesting a

bedside alarm when she is out of the room. 



OBJECTIVE:  VITAL SIGNS:  With temperature 98.8, pulse 83, respirations 20, O2 sats

98% on room air, and blood pressure 140/77. 

LUNGS:  Clear. 

CARDIAC:  Shows regular rhythm.  No gallops or murmurs. 

EXTREMITIES:  Show left lateral hip incision healing well. 

NEUROLOGICAL:  Shows significant bradykinesia, masked facies with minimal tremor.

Neurologic shows the patient is slow in his responses and becomes fatigued and

somnolent very easily. 



ASSESSMENT:  

1. Resolving left hip fracture, status post open reduction and internal fixation.

2. Stable chronic obstructive pulmonary disease with no exacerbation.

3. Slowly progressive Parkinson disease and bradykinesia and mild tremor with just

some difficulty ambulating and transferring, requiring hospital bed and walker. 

4. Lewy body dementia, which is slowly progressing, but the patient is still

cooperating. 

5. Coronary artery disease, status post myocardial infarction and defibrillator many

years ago.  No evidence for recurrent pain. 



PLAN:  Plan for discharge home on October 30th with hospital bed, walker with

[facility] to be followed by PCP at [facility]. 







Job ID:  [id number]

## 2020-10-30 VITALS — TEMPERATURE: 98.2 F | SYSTOLIC BLOOD PRESSURE: 114 MMHG | DIASTOLIC BLOOD PRESSURE: 71 MMHG

## 2020-10-30 RX ADMIN — Medication SCH TAB: at 09:55

## 2020-10-30 RX ADMIN — MOMETASONE FUROATE AND FORMOTEROL FUMARATE DIHYDRATE SCH PUFF: 200; 5 AEROSOL RESPIRATORY (INHALATION) at 05:16

## 2020-10-30 RX ADMIN — LACTOBACILLUS ACIDOPH-L.BULGARICUS 1 MILLION CELL CHEWABLE TABLET SCH TAB: at 09:56
